# Patient Record
Sex: FEMALE | Race: WHITE | Employment: FULL TIME | URBAN - METROPOLITAN AREA
[De-identification: names, ages, dates, MRNs, and addresses within clinical notes are randomized per-mention and may not be internally consistent; named-entity substitution may affect disease eponyms.]

---

## 2017-11-06 PROBLEM — H11.442 CONJUNCTIVAL CYST OF LEFT EYE: Status: ACTIVE | Noted: 2017-11-06

## 2017-11-06 PROBLEM — H11.442 CONJUNCTIVAL CYST OF LEFT EYE: Status: RESOLVED | Noted: 2017-11-06 | Resolved: 2017-11-06

## 2018-03-23 LAB
CHLAMYDIA, EXTERNAL: NEGATIVE
HBSAG, EXTERNAL: NEGATIVE
HIV, EXTERNAL: NONREACTIVE
N. GONORRHEA, EXTERNAL: NEGATIVE
RPR, EXTERNAL: NORMAL
RUBELLA, EXTERNAL: NORMAL
TYPE, ABO & RH, EXTERNAL: NORMAL

## 2018-06-08 ENCOUNTER — HOSPITAL ENCOUNTER (EMERGENCY)
Age: 28
Discharge: HOME | End: 2018-06-08
Attending: EMERGENCY MEDICINE
Payer: COMMERCIAL

## 2018-06-08 VITALS
RESPIRATION RATE: 16 BRPM | SYSTOLIC BLOOD PRESSURE: 111 MMHG | HEART RATE: 105 BPM | DIASTOLIC BLOOD PRESSURE: 75 MMHG | OXYGEN SATURATION: 100 % | TEMPERATURE: 97 F

## 2018-06-08 DIAGNOSIS — O26.899 ABDOMINAL PAIN AFFECTING PREGNANCY: Primary | ICD-10-CM

## 2018-06-08 DIAGNOSIS — T75.4XXA ELECTROCUTION AND NONFATAL EFFECTS OF ELECTRIC CURRENT, INITIAL ENCOUNTER: ICD-10-CM

## 2018-06-08 DIAGNOSIS — R10.9 ABDOMINAL PAIN AFFECTING PREGNANCY: Primary | ICD-10-CM

## 2018-06-08 PROCEDURE — 99283 EMERGENCY DEPT VISIT LOW MDM: CPT

## 2018-06-08 NOTE — ED TRIAGE NOTES
\"I was plugging something into the wall yesterday and I got shocked. I feel fine. I'm about 19 weeks pregnant and my OB was concerned and said I should get checked out. I haven't been able to feel the baby move yet in my pregnancy before this so I didn't know if everything was ok. \"

## 2018-06-08 NOTE — ED PROVIDER NOTES
EMERGENCY DEPARTMENT HISTORY AND PHYSICAL EXAM    9:41 AM      Date: 2018  Patient Name: Fidelina Larios    History of Presenting Illness     Chief Complaint   Patient presents with    Electric Shock         History Provided By: Patient    Chief Complaint: abdominal cramping  Duration:  Hours  Timing:  Acute  Location: diffuse  Quality: Cramping  Severity: Mild  Modifying Factors: none  Associated Symptoms: denies any other associated signs or symptoms      Additional History (Context): Fidelina Larios is a 25 y.o. female with hx of ADD, , ~18.5 weeks pregnant who presents with c/o diffuse abdominal cramping since last night. Pt was plugging in a timer to an electrical outlet around 9pm when she was shocked by it. Notes she has had mild abdominal cramping since the incident. Notes she called her ob who instructed her to come to the ED for evaluation. Denies vaginal bleeding, chest pain, vomiting, diarrhea, dysuria, hematuria. PCP: Kennedy Corea MD    Current Outpatient Prescriptions   Medication Sig Dispense Refill    DEXTROAMPHETAMINE/AMPHETAMINE (ADDERALL PO) Take  by mouth.  norgestimate-ethinyl estradiol (ORTHO-CYCLEN, 28,) 0.25-35 mg-mcg tab Take 1 Tab by mouth daily. Past History     Past Medical History:  Past Medical History:   Diagnosis Date    ADD (attention deficit disorder)     Conjunctival cyst of left eye 2017    Nausea & vomiting        Past Surgical History:  Past Surgical History:   Procedure Laterality Date    HX WISDOM TEETH EXTRACTION         Family History:  History reviewed. No pertinent family history. Social History:  Social History   Substance Use Topics    Smoking status: Never Smoker    Smokeless tobacco: Never Used    Alcohol use 1.8 oz/week     1 Glasses of wine, 1 Cans of beer, 1 Shots of liquor per week       Allergies:  No Known Allergies      Review of Systems       Review of Systems   Constitutional: Negative for chills and fever. Respiratory: Negative for shortness of breath. Cardiovascular: Negative for chest pain. Gastrointestinal: Positive for abdominal pain. Negative for nausea and vomiting. Genitourinary: Negative for pelvic pain, vaginal bleeding, vaginal discharge and vaginal pain. Skin: Negative for rash. Neurological: Negative for weakness. All other systems reviewed and are negative. Physical Exam     Visit Vitals    /75 (BP 1 Location: Left arm, BP Patient Position: Sitting)    Pulse (!) 105    Temp 97 °F (36.1 °C)    Resp 16    LMP 10/26/2017    SpO2 100%         Physical Exam   Constitutional: She appears well-developed and well-nourished. No distress. HENT:   Head: Normocephalic and atraumatic. Neck: Normal range of motion. Neck supple. Cardiovascular: Normal rate, regular rhythm, normal heart sounds and intact distal pulses. Exam reveals no gallop and no friction rub. No murmur heard. Pulmonary/Chest: Effort normal and breath sounds normal. No respiratory distress. She has no wheezes. She has no rales. Abdominal: Soft. Bowel sounds are normal. She exhibits no distension and no mass. There is no tenderness. There is no rebound and no guarding. Gravid to umbilicus     Neurological: She is alert. Skin: Skin is warm. No rash noted. She is not diaphoretic. Nursing note and vitals reviewed. Diagnostic Study Results     Labs -  No results found for this or any previous visit (from the past 12 hour(s)). Radiologic Studies -   No orders to display         Medical Decision Making   I am the first provider for this patient. I reviewed the vital signs, available nursing notes, past medical history, past surgical history, family history and social history. Vital Signs-Reviewed the patient's vital signs.       Records Reviewed: Nursing Notes and Old Medical Records (Time of Review: 9:41 AM)    ED Course: Progress Notes, Reevaluation, and Consults:  10:10 AM Reviewed results with patient. Discussed need for close outpatient follow-up. Discussed strict return precautions, including vaginal bleeding, increased abdominal pain, or any other medical concerns. Provider Notes (Medical Decision Making): 24 yo F , ~18.5 weeks pregnant who presents due to abdominal pain after electric shock. No vaginal bleeding or rush of fluids. Bedside US performed by Dr. Terry Calderon demonstrates fetal heart beat 160 and good fetal movements. Pt has ob for follow-up. Stable for d/c with outpatient follow-up. Procedures: Bedside US  Date/Time: 2018 10:09 AM  Performed by: Antony Henderson  Authorized by: Antony Henderson     Written consent obtained: Yes    Given by:  Patient  Performed by: Attending  Type of Procedure: abdominal examination, obstetrical; fetal heart beat 160, good movement of fetus           Diagnosis     Clinical Impression:   1. Abdominal pain affecting pregnancy    2. Electrocution and nonfatal effects of electric current, initial encounter        Disposition: home     Follow-up Information     Follow up With Details Comments Contact [facility] EMERGENCY DEPT  If symptoms worsen [address]    Ashlyn Raymond MD In 2 days  Jeffery Doe [phone]  [phone]             Patient's Medications   Start Taking    No medications on file   Continue Taking    DEXTROAMPHETAMINE/AMPHETAMINE (ADDERALL PO)    Take  by mouth. NORGESTIMATE-ETHINYL ESTRADIOL (ORTHO-CYCLEN, 28,) 0.25-35 MG-MCG TAB    Take 1 Tab by mouth daily.    These Medications have changed    No medications on file   Stop Taking    No medications on file

## 2018-06-08 NOTE — LETTER
[facility] EMERGENCY DEPT 
[address] [phone] 
[phone] Work/School Note Date: 6/8/2018 To Whom It May concern: 
 
Merlene Mcclain was seen and treated today in the emergency room by the following provider(s): 
Attending Provider: Justin Snider DO Physician Assistant: Neena Duran. Merlene Mcclain may return to work on 6/9/18. Sincerely, 
 
 
 
 
Neena Duran

## 2018-06-08 NOTE — DISCHARGE INSTRUCTIONS
Electrical Shock: Care Instructions  Your Care Instructions  You have had an electrical shock. This may have happened when you used an electrical appliance or power cord. Lightning and stun guns also can cause electrical shocks. The shock can cause a burn where the current enters and leaves your body. The electricity may have injured blood vessels, nerves, and muscles. The electricity also could have affected your heart and lungs. You might not see all the damage the shock caused for up to 10 days after the shock. Your doctor will tell you what to look for depending on the type of shock you got. Follow-up care is a key part of your treatment and safety. Be sure to make and go to all appointments, and call your doctor if you are having problems. It's also a good idea to know your test results and keep a list of the medicines you take. How can you care for yourself at home? If you have a mild burn:  · Clean the area each day with mild soap and water. · Bandage the wound. ¨ If the doctor told you to use an ointment under the bandage, use it exactly as directed. ¨ Cover the burn with a nonstick gauze pad. ¨ Tape the pad to your skin, well away from the burn, or follow other dressing instructions, as your doctor advises. ¨ Do not wrap tape all the way around a hand, arm, or leg. This can cause swelling. ¨ Keep the bandage clean and dry. Change the bandage 2 times a day and anytime it gets wet. · Do not break blisters open. This increases the chance of infection. If a blister breaks open by itself, blot up the liquid, and leave the skin that covered the blister. This helps protect the new skin. For pain and itching:  · Take an over-the-counter pain medicine, such as acetaminophen (Tylenol), ibuprofen (Advil, Motrin), or naproxen (Aleve). Read and follow all instructions on the label. Do not use aspirin, because it can make bleeding in the burned area worse.   · Do not take two or more pain medicines at the same time unless the doctor told you to. Many pain medicines have acetaminophen, which is Tylenol. Too much acetaminophen (Tylenol) can be harmful. · If the burn itches, do not scratch it. Try an over-the-counter antihistamine, such as diphenhydramine (Benadryl) or loratadine (Claritin). Read and follow all instructions on the label. Preventing electrical shocks at home  · Place plug covers on all outlets. · Use extra caution when using electrical items in areas where water sources are nearby, such as using a hair dryer in the bathroom. · Do not overload electrical outlets by using too many extension cords or electrical receptacle multipliers. · Replace electrical equipment and appliances that show signs of wear, such as having frayed or loose wires. When should you call for help? Call 911 anytime you think you may need emergency care. For example, call if:  ? · You passed out (lost consciousness). ?Call your doctor now or seek immediate medical care if:  ? · You have symptoms of infection, such as:  ¨ Increased pain, swelling, warmth, or redness. ¨ Red streaks leading from the area. ¨ Pus draining from the area. ¨ A fever. ? · You have a hard time breathing. ? · You have new or worse pain in the burn area. ? · Your urine looks pink or brown. ? · Your muscles ache or feel weak. ? Watch closely for changes in your health, and be sure to contact your doctor if you have any problems. Where can you learn more? Go to http://kraig-dena.info/. Enter 33 17 13 in the search box to learn more about \"Electrical Shock: Care Instructions. \"  Current as of: March 20, 2017  Content Version: 11.4  © 5078-4309 [employer]. Care instructions adapted under license by [employer] (which disclaims liability or warranty for this information).  If you have questions about a medical condition or this instruction, always ask your healthcare professional. [employer] disclaims any warranty or liability for your use of this information. Belly Pain in Pregnancy: Care Instructions  Your Care Instructions    When you're pregnant, any belly pain can be a worry. You may not want to call your doctor about every pain you have. But you don't want to miss something that is dangerous for you or your baby. Even if it feels familiar, belly pain can mean something new when you're pregnant. It's important to know when to call your doctor. It will also help to know how to care for yourself at home when your pain is not caused by anything harmful. · When belly pain is more severe or constant, see a doctor right away. · If you're sure your belly pain is a sign of labor, call your doctor. · When belly pain is brief, it's usually a normal part of pregnancy. It might be related to changes in the growing uterus. Or it could be the stretching of ligaments called round ligaments. These ligaments help support the uterus. Round ligament pain can be on either side of your belly. It can also be felt in your hips or groin. Follow-up care is a key part of your treatment and safety. Be sure to make and go to all appointments, and call your doctor if you are having problems. It's also a good idea to know your test results and keep a list of the medicines you take. How can you tell if belly pain is a sign of labor? When belly pain is caused by labor, it can feel like mild or menstrual-like cramps in your lower belly. These cramps are probably contractions. They can happen in your second or third trimester. You may also have:  · A steady, dull ache in your lower back, pelvis, or thighs. · A feeling of pressure in your pelvis or lower belly. · Changes in your vaginal discharge or a sudden release of fluid from the vagina. If you think you are in labor, call your doctor. How can you care for yourself at home?   When belly pain is mild and is not a symptom of labor:  · Rest until you feel better. · Take a warm bath. · Think about what you drink and eat:  ¨ Drink plenty of fluids. Choose water and other caffeine-free clear liquids until you feel better. ¨ Try eating small, frequent meals. If your stomach is upset, try bland, low-fat foods like plain rice, broiled chicken, toast, and yogurt. · Think about how you move if you are having brief pains from stretching of the round ligaments. ¨ Try gentle stretching. ¨ Move a little more slowly when turning in bed or getting up from a chair, so those ligaments don't stretch quickly. ¨ Lean forward a bit if you think you are going to cough or sneeze. When should you call for help? Call 911 anytime you think you may need emergency care. For example, call if:  ? · You have sudden, severe pain in your belly. ? · You have severe vaginal bleeding. ?Call your doctor now or seek immediate medical care if:  ? · You have new or worse belly pain or cramping. ? · You have any vaginal bleeding. ? · You have a fever. ? · You have symptoms of preeclampsia, such as:  ¨ Sudden swelling of your face, hands, or feet. ¨ New vision problems (such as dimness or blurring). ¨ A severe headache. ? · You think that you may be in labor. This means that you've had at least 8 contractions within 1 hour or at least 4 contractions within 20 minutes, even after you change your position and drink fluids. ? · You have symptoms of a urinary tract infection. These may include:  ¨ Pain or burning when you urinate. ¨ A frequent need to urinate without being able to pass much urine. ¨ Pain in the flank, which is just below the rib cage and above the waist on either side of the back. ¨ Blood in your urine. ? Watch closely for changes in your health, and be sure to contact your doctor if you are worried about your or your baby's health. Where can you learn more? Go to http://kraig-dena.info/.   Enter 280 [id number] 005 in the search box to learn more about \"Belly Pain in Pregnancy: Care Instructions. \"  Current as of: March 16, 2017  Content Version: 11.4  © 4376-9686 [employer]. Care instructions adapted under license by "[employer]" (which disclaims liability or warranty for this information). If you have questions about a medical condition or this instruction, always ask your healthcare professional. [address] any warranty or liability for your use of this information.

## 2018-08-01 ENCOUNTER — HOSPITAL ENCOUNTER (EMERGENCY)
Age: 28
Discharge: HOME | End: 2018-08-01
Attending: OBSTETRICS & GYNECOLOGY | Admitting: OBSTETRICS & GYNECOLOGY
Payer: COMMERCIAL

## 2018-08-01 VITALS
HEART RATE: 99 BPM | WEIGHT: 163 LBS | DIASTOLIC BLOOD PRESSURE: 55 MMHG | BODY MASS INDEX: 27.83 KG/M2 | RESPIRATION RATE: 18 BRPM | SYSTOLIC BLOOD PRESSURE: 113 MMHG | TEMPERATURE: 97.9 F | HEIGHT: 64 IN

## 2018-08-01 LAB
ALBUMIN SERPL-MCNC: 2.5 G/DL (ref 3.4–5)
ALBUMIN/GLOB SERPL: 0.7 {RATIO} (ref 0.8–1.7)
ALP SERPL-CCNC: 110 U/L (ref 45–117)
ALT SERPL-CCNC: 16 U/L (ref 13–56)
ANION GAP SERPL CALC-SCNC: 7 MMOL/L (ref 3–18)
APPEARANCE UR: CLEAR
AST SERPL-CCNC: 15 U/L (ref 15–37)
BASOPHILS # BLD: 0 K/UL (ref 0–0.1)
BASOPHILS NFR BLD: 0 % (ref 0–2)
BILIRUB SERPL-MCNC: 0.3 MG/DL (ref 0.2–1)
BILIRUB UR QL: NEGATIVE
BUN SERPL-MCNC: 7 MG/DL (ref 7–18)
BUN/CREAT SERPL: 15 (ref 12–20)
CALCIUM SERPL-MCNC: 8.2 MG/DL (ref 8.5–10.1)
CHLORIDE SERPL-SCNC: 107 MMOL/L (ref 100–108)
CO2 SERPL-SCNC: 26 MMOL/L (ref 21–32)
COLOR UR: YELLOW
CREAT SERPL-MCNC: 0.47 MG/DL (ref 0.6–1.3)
DIFFERENTIAL METHOD BLD: ABNORMAL
EOSINOPHIL # BLD: 0 K/UL (ref 0–0.4)
EOSINOPHIL NFR BLD: 1 % (ref 0–5)
ERYTHROCYTE [DISTWIDTH] IN BLOOD BY AUTOMATED COUNT: 14.3 % (ref 11.6–14.5)
GLOBULIN SER CALC-MCNC: 3.6 G/DL (ref 2–4)
GLUCOSE SERPL-MCNC: 108 MG/DL (ref 74–99)
GLUCOSE UR QL STRIP.AUTO: NEGATIVE MG/DL
HCT VFR BLD AUTO: 31.5 % (ref 35–45)
HGB BLD-MCNC: 10.5 G/DL (ref 12–16)
KETONES UR-MCNC: NEGATIVE MG/DL
LEUKOCYTE ESTERASE UR QL STRIP: ABNORMAL
LYMPHOCYTES # BLD: 2.1 K/UL (ref 0.9–3.6)
LYMPHOCYTES NFR BLD: 24 % (ref 21–52)
MCH RBC QN AUTO: 31.1 PG (ref 24–34)
MCHC RBC AUTO-ENTMCNC: 33.3 G/DL (ref 31–37)
MCV RBC AUTO: 93.2 FL (ref 74–97)
MONOCYTES # BLD: 0.6 K/UL (ref 0.05–1.2)
MONOCYTES NFR BLD: 7 % (ref 3–10)
NEUTS SEG # BLD: 5.9 K/UL (ref 1.8–8)
NEUTS SEG NFR BLD: 68 % (ref 40–73)
NITRITE UR QL: NEGATIVE
PH UR: 7 [PH] (ref 5–9)
PLATELET # BLD AUTO: 200 K/UL (ref 135–420)
PMV BLD AUTO: 10.4 FL (ref 9.2–11.8)
POTASSIUM SERPL-SCNC: 3.9 MMOL/L (ref 3.5–5.5)
PROT SERPL-MCNC: 6.1 G/DL (ref 6.4–8.2)
PROT UR QL: NEGATIVE MG/DL
RBC # BLD AUTO: 3.38 M/UL (ref 4.2–5.3)
RBC # UR STRIP: NEGATIVE /UL
SERVICE CMNT-IMP: ABNORMAL
SODIUM SERPL-SCNC: 140 MMOL/L (ref 136–145)
SP GR UR: 1.01 (ref 1–1.02)
UROBILINOGEN UR QL: 0.2 EU/DL (ref 0.2–1)
WBC # BLD AUTO: 8.6 K/UL (ref 4.6–13.2)

## 2018-08-01 PROCEDURE — 80053 COMPREHEN METABOLIC PANEL: CPT | Performed by: OBSTETRICS & GYNECOLOGY

## 2018-08-01 PROCEDURE — 81003 URINALYSIS AUTO W/O SCOPE: CPT

## 2018-08-01 PROCEDURE — 85025 COMPLETE CBC W/AUTO DIFF WBC: CPT | Performed by: OBSTETRICS & GYNECOLOGY

## 2018-08-01 PROCEDURE — 99282 EMERGENCY DEPT VISIT SF MDM: CPT

## 2018-08-01 NOTE — DISCHARGE INSTRUCTIONS
Anemia: Care Instructions  Your Care Instructions    Anemia is a low level of red blood cells, which carry oxygen throughout your body. Many things can cause anemia. Lack of iron is one of the most common causes. Your body needs iron to make hemoglobin, a substance in red blood cells that carries oxygen from the lungs to your body's cells. Without enough iron, the body produces fewer and smaller red blood cells. As a result, your body's cells do not get enough oxygen, and you feel tired and weak. And you may have trouble concentrating. Bleeding is the most common cause of a lack of iron. You may have heavy menstrual bleeding or bleeding caused by conditions such as ulcers, hemorrhoids, or cancer. Regular use of aspirin or other anti-inflammatory medicines (such as ibuprofen) also can cause bleeding in some people. A lack of iron in your diet also can cause anemia, especially at times when the body needs more iron, such as during pregnancy, infancy, and the teen years. Your doctor may have prescribed iron pills. It may take several months of treatment for your iron levels to return to normal. Your doctor also may suggest that you eat foods that are rich in iron, such as meat and beans. There are many other causes of anemia. It is not always due to a lack of iron. Finding the specific cause of your anemia will help your doctor find the right treatment for you. Follow-up care is a key part of your treatment and safety. Be sure to make and go to all appointments, and call your doctor if you are having problems. It's also a good idea to know your test results and keep a list of the medicines you take. How can you care for yourself at home? · Take your medicines exactly as prescribed. Call your doctor if you think you are having a problem with your medicine. · If your doctor recommends iron pills, take them as directed:  ¨ Try to take the pills on an empty stomach about 1 hour before or 2 hours after meals. But you may need to take iron with food to avoid an upset stomach. ¨ Do not take antacids or drink milk or caffeine drinks (such as coffee, tea, or cola) at the same time or within 2 hours of the time that you take your iron. They can make it hard for your body to absorb the iron. ¨ Vitamin C (from food or supplements) helps your body absorb iron. Try taking iron pills with a glass of orange juice or some other food that is high in vitamin C, such as citrus fruits. ¨ Iron pills may cause stomach problems, such as heartburn, nausea, diarrhea, constipation, and cramps. Be sure to drink plenty of fluids, and include fruits, vegetables, and fiber in your diet each day. Iron pills often make your bowel movements dark or green. ¨ If you forget to take an iron pill, do not take a double dose of iron the next time you take a pill. ¨ Keep iron pills out of the reach of small children. An overdose of iron can be very dangerous. · Follow your doctor's advice about eating iron-rich foods. These include red meat, shellfish, poultry, eggs, beans, raisins, whole-grain bread, and leafy green vegetables. · Steam vegetables to help them keep their iron content. When should you call for help? Call 911 anytime you think you may need emergency care. For example, call if:    · You have symptoms of a heart attack. These may include:  ¨ Chest pain or pressure, or a strange feeling in the chest.  ¨ Sweating. ¨ Shortness of breath. ¨ Nausea or vomiting. ¨ Pain, pressure, or a strange feeling in the back, neck, jaw, or upper belly or in one or both shoulders or arms. ¨ Lightheadedness or sudden weakness. ¨ A fast or irregular heartbeat. After you call 911, the  may tell you to chew 1 adult-strength or 2 to 4 low-dose aspirin. Wait for an ambulance.  Do not try to drive yourself.     · You passed out (lost consciousness).    Call your doctor now or seek immediate medical care if:    · You have new or increased shortness of breath.     · You are dizzy or lightheaded, or you feel like you may faint.     · Your fatigue and weakness continue or get worse.     · You have any abnormal bleeding, such as:  ¨ Nosebleeds. ¨ Vaginal bleeding that is different (heavier, more frequent, at a different time of the month) than what you are used to. ¨ Bloody or black stools, or rectal bleeding. ¨ Bloody or pink urine.    Watch closely for changes in your health, and be sure to contact your doctor if:    · You do not get better as expected. Where can you learn more? Go to http://kraig-dena.info/. Enter R301 in the search box to learn more about \"Anemia: Care Instructions. \"  Current as of: 2017  Content Version: 11.7  © 7996-1914 [employer]. Care instructions adapted under license by [employer] (which disclaims liability or warranty for this information). If you have questions about a medical condition or this instruction, always ask your healthcare professional. [place] [address] any warranty or liability for your use of this information. Weeks 26 to 30 of Your Pregnancy: Care Instructions  Your Care Instructions    You are now in your last trimester of pregnancy. Your baby is growing rapidly. And you'll probably feel your baby moving around more often. Your doctor may ask you to count your baby's kicks. Your back may ache as your body gets used to your baby's size and length. If you haven't already had the Tdap shot during this pregnancy, talk to your doctor about getting it. It will help protect your  against pertussis infection. During this time, it's important to take care of yourself and pay attention to what your body needs. If you feel sexual, explore ways to be close with your partner that match your comfort and desire. Use the tips provided in this care sheet to find ways to be sexual in your own way.   Follow-up care is a key part of your treatment and safety. Be sure to make and go to all appointments, and call your doctor if you are having problems. It's also a good idea to know your test results and keep a list of the medicines you take. How can you care for yourself at home? Take it easy at work  · Take frequent breaks. If possible, stop working when you are tired, and rest during your lunch hour. · Take bathroom breaks every 2 hours. · Change positions often. If you sit for long periods, stand up and walk around. · When you stand for a long time, keep one foot on a low stool with your knee bent. After standing a lot, sit with your feet up. · Avoid fumes, chemicals, and tobacco smoke. Be sexual in your own way  · Having sex during pregnancy is okay, unless your doctor tells you not to. · You may be very interested in sex, or you may have no interest at all. · Your growing belly can make it hard to find a good position during intercourse. [place] and explore. · You may get cramps in your uterus when your partner touches your breasts. · A back rub may relieve the backache or cramps that sometimes follow orgasm. Learn about  labor  · Watch for signs of  labor. You may be going into labor if:  ¨ You have menstrual-like cramps, with or without nausea. ¨ You have about 6 or more contractions in 1 hour, even after you have had a glass of water and are resting. ¨ You have a low, dull backache that does not go away when you change your position. ¨ You have pain or pressure in your pelvis that comes and goes in a pattern. ¨ You have intestinal cramping or flu-like symptoms, with or without diarrhea. ¨ You notice an increase or change in your vaginal discharge. Discharge may be heavy, mucus-like, watery, or streaked with blood. ¨ Your water breaks. · If you think you have  labor:  ¨ Drink 2 or 3 glasses of water or juice. Not drinking enough fluids can cause contractions.   ¨ Stop what you are doing, and empty your bladder. Then lie down on your left side for at least 1 hour. ¨ While lying on your side, find your breast bone. Put your fingers in the soft spot just below it. Move your fingers down toward your belly button to find the top of your uterus. Check to see if it is tight. ¨ Contractions can be weak or strong. Record your contractions for an hour. Time a contraction from the start of one contraction to the start of the next one. ¨ Single or several strong contractions without a pattern are called Omero-Perez contractions. They are practice contractions but not the start of labor. They often stop if you change what you are doing. ¨ Call your doctor if you have regular contractions. Where can you learn more? Go to http://kraig-dena.info/. Enter O219 in the search box to learn more about \"Weeks 26 to 30 of Your Pregnancy: Care Instructions. \"  Current as of: November 21, 2017  Content Version: 11.7  © 8166-7358 [employer]. Care instructions adapted under license by [employer] (which disclaims liability or warranty for this information). If you have questions about a medical condition or this instruction, always ask your healthcare professional. [address] any warranty or liability for your use of this information.

## 2018-08-01 NOTE — IP AVS SNAPSHOT
[address] Patient: Ardyth Bernheim MRN: [id number] FHU:[id number] A check bk indicates which time of day the medication should be taken. My Medications ASK your doctor about these medications Instructions Each Dose to Equal  
 Morning Noon Evening Bedtime ADDERALL PO Your last dose was: Your next dose is: Take  by mouth. ORTHO-CYCLEN (28) 0.25-35 mg-mcg Tab Generic drug:  norgestimate-ethinyl estradiol Your last dose was: Your next dose is: Take 1 Tab by mouth daily. 1 Tab

## 2018-08-01 NOTE — H&P
Obstetrical Problem History & Physical  
 
Name: Amena Dickerson MRN: [id number]  SSN: [id number] YOB: 1990  Age: 25 y.o. Sex: female Subjective: Chief complaint:   
Chief Complaint Patient presents with  Loss of Consciousness  Dizziness 28yo  at 26.1wks with CRYSTAL 18 by LMP c/w 8.0wk US. She follows with Dr. Ade Galvan for prenatal care. Pt reports felt dizzy then \"passed out\" and hit top of abdomen on a desk. She reports ate a donut and had apple juice 1hr prior. No other food. She denies any complaints at this time. She denies ctxs, VB, LOF, HA, visual changes, abd pain, CP, SOB, dizziness, lightheadedness, palpitations. She reports intermittent palpitations and dizziness since 20wks. She already has an appt with cardiology 18 at 0930. +FM. Patient reports has been moving into a new house recently and has been very active. OB HISTORY 
OB History  Para Term  AB Living 2 1 1   1 SAB TAB Ectopic Molar Multiple Live Births 0 1 PAST MEDICAL HISTORY 
GERD 
HSV 
ADHD - weaning off Adderall PAST SURGICAL HISTORY Past Surgical History:  
Procedure Laterality Date  HX WISDOM TEETH EXTRACTION    
 
 
SOCIAL HISTORY Social History Social History  Marital status:  Spouse name: Jose Zimmerman  Number of children: N/A  
 Years of education: N/A Occupational History  Not on file. Social History Main Topics  Smoking status: Never Smoker  Smokeless tobacco: Never Used  Alcohol use No  
 Drug use: No  
 Sexual activity: Yes  
  Partners: Male Other Topics Concern  Not on file Social History Narrative FAMILY HISTORY 
HTN - mother, MGM Lung cancer - MGF Diabetes - PGF Lymphoma - mother Pharyngeal cancer - PGF ALLERGY: 
No Known Allergies HOME MEDICATIONS: 
Prenatal vitamin daily Adderall 20mg daily Review of Systems: A comprehensive review of systems was negative other than stated in HPI. Objective: VITAL SIGNS: 
Visit Vitals  /55  Pulse 99  Temp 97.9 °F (36.6 °C)  Resp 18  Ht 5' 4\" (1.626 m)  Wt 73.9 kg (163 lb)  BMI 27.98 kg/m2 Physical Exam: 
Gen - A&D, NAD, pt walking in high heels without difficutly CV - RRR Pulm - CTAB Abd - soft, nttp, gravid Ext - nttp, no clubbing/cyanosis/edema, 2+ pedal pulses TOCO - no ctxs FHTs - 140s, mod variability, +accels, no decels BSUS - cephalic, posterior placenta, SDP 5.2, +FM, placenta intact without evidence of trauma/abruption/abnormalities Recent Results (from the past 12 hour(s)) POC URINE MACROSCOPIC Collection Time: 08/01/18  2:12 PM  
Result Value Ref Range Color YELLOW Appearance CLEAR Spec. gravity (POC) 1.015 1.001 - 1.023    
 pH, urine  (POC) 7.0 5.0 - 9.0 Protein (POC) NEGATIVE  NEG mg/dL Glucose, urine (POC) NEGATIVE  NEG mg/dL Ketones (POC) NEGATIVE  NEG mg/dL Bilirubin (POC) NEGATIVE  NEG Blood (POC) NEGATIVE  NEG Urobilinogen (POC) 0.2 0.2 - 1.0 EU/dL Nitrite (POC) NEGATIVE  NEG Leukocyte esterase (POC) SMALL (A) NEG Performed by Jvoani Alejo CBC WITH AUTOMATED DIFF Collection Time: 08/01/18  2:18 PM  
Result Value Ref Range WBC 8.6 4.6 - 13.2 K/uL  
 RBC 3.38 (L) 4.20 - 5.30 M/uL  
 HGB 10.5 (L) 12.0 - 16.0 g/dL HCT 31.5 (L) 35.0 - 45.0 % MCV 93.2 74.0 - 97.0 FL  
 MCH 31.1 24.0 - 34.0 PG  
 MCHC 33.3 31.0 - 37.0 g/dL  
 RDW 14.3 11.6 - 14.5 % PLATELET 945 311 - 488 K/uL MPV 10.4 9.2 - 11.8 FL  
 NEUTROPHILS 68 40 - 73 % LYMPHOCYTES 24 21 - 52 % MONOCYTES 7 3 - 10 % EOSINOPHILS 1 0 - 5 % BASOPHILS 0 0 - 2 %  
 ABS. NEUTROPHILS 5.9 1.8 - 8.0 K/UL  
 ABS. LYMPHOCYTES 2.1 0.9 - 3.6 K/UL  
 ABS. MONOCYTES 0.6 0.05 - 1.2 K/UL  
 ABS. EOSINOPHILS 0.0 0.0 - 0.4 K/UL  
 ABS. BASOPHILS 0.0 0.0 - 0.1 K/UL  
 DF AUTOMATED METABOLIC PANEL, COMPREHENSIVE  
 Collection Time: 18  2:18 PM  
Result Value Ref Range Sodium 140 136 - 145 mmol/L Potassium 3.9 3.5 - 5.5 mmol/L Chloride 107 100 - 108 mmol/L  
 CO2 26 21 - 32 mmol/L Anion gap 7 3.0 - 18 mmol/L Glucose 108 (H) 74 - 99 mg/dL BUN 7 7.0 - 18 MG/DL Creatinine 0.47 (L) 0.6 - 1.3 MG/DL  
 BUN/Creatinine ratio 15 12 - 20 GFR est AA >60 >60 ml/min/1.73m2 GFR est non-AA >60 >60 ml/min/1.73m2 Calcium 8.2 (L) 8.5 - 10.1 MG/DL Bilirubin, total 0.3 0.2 - 1.0 MG/DL  
 ALT (SGPT) 16 13 - 56 U/L  
 AST (SGOT) 15 15 - 37 U/L Alk. phosphatase 110 45 - 117 U/L Protein, total 6.1 (L) 6.4 - 8.2 g/dL Albumin 2.5 (L) 3.4 - 5.0 g/dL Globulin 3.6 2.0 - 4.0 g/dL A-G Ratio 0.7 (L) 0.8 - 1.7 AssessmentPlan:  
 
29yo  at 26.1wks - Pt asymptomatic with VSS and wnl since presentation. Monitored for several hours and patient remains asymptomatic  Labs wnl.  
- Hgb 10.5 - advised iron with Vit C. Pt reports has taken [place] before and desires to take this - Advised patient to stay well hydrated and have small frequent meals throughout the day. - Discussed importance of cardiology appt - Lifting precautions reviewed with patient's move this week - F/u with next scheduled appt with Dr. Beatrice Kate next week.   
 
 
Signed By:   
Rosita Vital MD 
2018 4:22 PM

## 2018-08-01 NOTE — PROGRESS NOTES
1505-Informed Dr Gretel Mccabe nurse of need to review labs/strip 1525-Dr Rudd's nurse Erna Biggs informed that lab results are back. Will review when available.

## 2018-08-01 NOTE — PROGRESS NOTES
Dr Marhta Higgins notified of patient arrival and complaints. Orders received for CBC/CMP, pt able to drink and eat at this time.

## 2018-08-01 NOTE — PROGRESS NOTES
Pt of Dr Glover Innocent ambulatory to unit for complaint of dizziness which resulted in syncope at her workplace,approximately 1030am today. Pt reports hitting her upper abdomen on her desk, just below sternum. Evidence of bruising noted. Pt states felt dizziness X2 over the weekend (once Saturday and once ). Pt is a  at 26.1 weeks gestation. Pt states she has a referral to cardiology since last week due to recurring heart pounding/racing and intermittent chest discomfort. Pt reports \"just a little bit\" of chest pain and SOB today when sitting in the car driving. EFM and TOCO applied, pt denies all symptoms at this time, states Dr Christopher Edwards office instructed her to come to LD for evaluation.

## 2018-08-01 NOTE — PROGRESS NOTES
1615- Discharge instructions including begin PO iron and increase fluids discussed by Dr Venkatesh Cesar and RN.  
9994- In room to sign D/C instructions, pt has already left unit.

## 2018-08-01 NOTE — IP AVS SNAPSHOT
[address] Patient: Ardyth Bernheim MRN: [id number] IJR:[id number] About your hospitalization You were admitted on:  N/A You last received care in the:  [address] [place] [address] You were discharged on:  August 1, 2018 Why you were hospitalized Your primary diagnosis was:  Not on File Follow-up Information None Discharge Orders None A check bk indicates which time of day the medication should be taken. My Medications ASK your doctor about these medications Instructions Each Dose to Equal  
 Morning Noon Evening Bedtime ADDERALL PO Your last dose was: Your next dose is: Take  by mouth. ORTHO-CYCLEN (28) 0.25-35 mg-mcg Tab Generic drug:  norgestimate-ethinyl estradiol Your last dose was: Your next dose is: Take 1 Tab by mouth daily. 1 Tab Discharge Instructions Anemia: Care Instructions Your Care Instructions Anemia is a low level of red blood cells, which carry oxygen throughout your body. Many things can cause anemia. Lack of iron is one of the most common causes. Your body needs iron to make hemoglobin, a substance in red blood cells that carries oxygen from the lungs to your body's cells. Without enough iron, the body produces fewer and smaller red blood cells. As a result, your body's cells do not get enough oxygen, and you feel tired and weak. And you may have trouble concentrating. Bleeding is the most common cause of a lack of iron. You may have heavy menstrual bleeding or bleeding caused by conditions such as ulcers, hemorrhoids, or cancer. Regular use of aspirin or other anti-inflammatory medicines (such as ibuprofen) also can cause bleeding in some people.  A lack of iron in your diet also can cause anemia, especially at times when the body needs more iron, such as during pregnancy, infancy, and the teen years. Your doctor may have prescribed iron pills. It may take several months of treatment for your iron levels to return to normal. Your doctor also may suggest that you eat foods that are rich in iron, such as meat and beans. There are many other causes of anemia. It is not always due to a lack of iron. Finding the specific cause of your anemia will help your doctor find the right treatment for you. Follow-up care is a key part of your treatment and safety. Be sure to make and go to all appointments, and call your doctor if you are having problems. It's also a good idea to know your test results and keep a list of the medicines you take. How can you care for yourself at home? · Take your medicines exactly as prescribed. Call your doctor if you think you are having a problem with your medicine. · If your doctor recommends iron pills, take them as directed: ¨ Try to take the pills on an empty stomach about 1 hour before or 2 hours after meals. But you may need to take iron with food to avoid an upset stomach. ¨ Do not take antacids or drink milk or caffeine drinks (such as coffee, tea, or cola) at the same time or within 2 hours of the time that you take your iron. They can make it hard for your body to absorb the iron. ¨ Vitamin C (from food or supplements) helps your body absorb iron. Try taking iron pills with a glass of orange juice or some other food that is high in vitamin C, such as citrus fruits. ¨ Iron pills may cause stomach problems, such as heartburn, nausea, diarrhea, constipation, and cramps. Be sure to drink plenty of fluids, and include fruits, vegetables, and fiber in your diet each day. Iron pills often make your bowel movements dark or green.  
¨ If you forget to take an iron pill, do not take a double dose of iron the next time you take a pill. ¨ Keep iron pills out of the reach of small children. An overdose of iron can be very dangerous. · Follow your doctor's advice about eating iron-rich foods. These include red meat, shellfish, poultry, eggs, beans, raisins, whole-grain bread, and leafy green vegetables. · Steam vegetables to help them keep their iron content. When should you call for help? Call 911 anytime you think you may need emergency care. For example, call if: 
  · You have symptoms of a heart attack. These may include: ¨ Chest pain or pressure, or a strange feeling in the chest. 
¨ Sweating. ¨ Shortness of breath. ¨ Nausea or vomiting. ¨ Pain, pressure, or a strange feeling in the back, neck, jaw, or upper belly or in one or both shoulders or arms. ¨ Lightheadedness or sudden weakness. ¨ A fast or irregular heartbeat. After you call 911, the  may tell you to chew 1 adult-strength or 2 to 4 low-dose aspirin. Wait for an ambulance. Do not try to drive yourself.  
  · You passed out (lost consciousness).  
 Call your doctor now or seek immediate medical care if: 
  · You have new or increased shortness of breath.  
  · You are dizzy or lightheaded, or you feel like you may faint.  
  · Your fatigue and weakness continue or get worse.  
  · You have any abnormal bleeding, such as: 
¨ Nosebleeds. ¨ Vaginal bleeding that is different (heavier, more frequent, at a different time of the month) than what you are used to. ¨ Bloody or black stools, or rectal bleeding. ¨ Bloody or pink urine.  
 Watch closely for changes in your health, and be sure to contact your doctor if: 
  · You do not get better as expected. Where can you learn more? Go to http://kraig-dena.info/. Enter R301 in the search box to learn more about \"Anemia: Care Instructions. \" Current as of: October 9, 2017 Content Version: 11.7 © 8352-6955 [employer], [employer].  Care instructions adapted under license by [address] (which disclaims liability or warranty for this information). If you have questions about a medical condition or this instruction, always ask your healthcare professional. [address] any warranty or liability for your use of this information. Weeks 26 to 30 of Your Pregnancy: Care Instructions Your Care Instructions You are now in your last trimester of pregnancy. Your baby is growing rapidly. And you'll probably feel your baby moving around more often. Your doctor may ask you to count your baby's kicks. Your back may ache as your body gets used to your baby's size and length. If you haven't already had the Tdap shot during this pregnancy, talk to your doctor about getting it. It will help protect your  against pertussis infection. During this time, it's important to take care of yourself and pay attention to what your body needs. If you feel sexual, explore ways to be close with your partner that match your comfort and desire. Use the tips provided in this care sheet to find ways to be sexual in your own way. Follow-up care is a key part of your treatment and safety. Be sure to make and go to all appointments, and call your doctor if you are having problems. It's also a good idea to know your test results and keep a list of the medicines you take. How can you care for yourself at home? Take it easy at work · Take frequent breaks. If possible, stop working when you are tired, and rest during your lunch hour. · Take bathroom breaks every 2 hours. · Change positions often. If you sit for long periods, stand up and walk around. · When you stand for a long time, keep one foot on a low stool with your knee bent. After standing a lot, sit with your feet up. · Avoid fumes, chemicals, and tobacco smoke. Be sexual in your own way · Having sex during pregnancy is okay, unless your doctor tells you not to. · You may be very interested in sex, or you may have no interest at all. · Your growing belly can make it hard to find a good position during intercourse. [place] and explore. · You may get cramps in your uterus when your partner touches your breasts. · A back rub may relieve the backache or cramps that sometimes follow orgasm. Learn about  labor · Watch for signs of  labor. You may be going into labor if: 
¨ You have menstrual-like cramps, with or without nausea. ¨ You have about 6 or more contractions in 1 hour, even after you have had a glass of water and are resting. ¨ You have a low, dull backache that does not go away when you change your position. ¨ You have pain or pressure in your pelvis that comes and goes in a pattern. ¨ You have intestinal cramping or flu-like symptoms, with or without diarrhea. ¨ You notice an increase or change in your vaginal discharge. Discharge may be heavy, mucus-like, watery, or streaked with blood. ¨ Your water breaks. · If you think you have  labor: ¨ Drink 2 or 3 glasses of water or juice. Not drinking enough fluids can cause contractions. ¨ Stop what you are doing, and empty your bladder. Then lie down on your left side for at least 1 hour. ¨ While lying on your side, find your breast bone. Put your fingers in the soft spot just below it. Move your fingers down toward your belly button to find the top of your uterus. Check to see if it is tight. ¨ Contractions can be weak or strong. Record your contractions for an hour. Time a contraction from the start of one contraction to the start of the next one. ¨ Single or several strong contractions without a pattern are called Brooks-Perez contractions. They are practice contractions but not the start of labor. They often stop if you change what you are doing. ¨ Call your doctor if you have regular contractions. Where can you learn more? Go to http://kraig-dena.info/. Enter P495 in the search box to learn more about \"Weeks 26 to 30 of Your Pregnancy: Care Instructions. \" Current as of: November 21, 2017 Content Version: 11.7 © 2586-3406 [employer]. Care instructions adapted under license by [employer] (which disclaims liability or warranty for this information). If you have questions about a medical condition or this instruction, always ask your healthcare professional. [address] any warranty or liability for your use of this information. Introducing hospitals & HEALTH SERVICES! Dear [facility]: 
Thank you for requesting a [employer] account. Our records indicate that you already have an active [employer] account. You can access your account anytime at [email or web address] Did you know that you can access your hospital and ER discharge instructions at any time in [employer]? You can also review all of your test results from your hospital stay or ER visit. Additional Information If you have questions, please visit the Frequently Asked Questions section of the [employer] website at [email or web address] Remember, [employer] is NOT to be used for urgent needs. For medical emergencies, dial 911. Now available from your iPhone and Android! Introducing Capo Diaz As a [employer] patient, I wanted to make you aware of our electronic visit tool called Capo RobertsalSpanning Cloud Apps. [employer] 24/7 allows you to connect within minutes with a medical provider 24 hours a day, seven days a week via a mobile device or tablet or logging into a secure website from your computer. You can access Capo Grahamfin from anywhere in the [place].  
 
A virtual visit might be right for you when you have a simple condition and feel like you just dont want to get out of bed, or cant get away from work for an appointment, when your regular [employer] provider is not available (evenings, weekends or holidays), or when youre out of town and need minor care. Electronic visits cost only $49 and if the Sal K121 24/JamKazam provider determines a prescription is needed to treat your condition, one can be electronically transmitted to a nearby pharmacy*. Please take a moment to enroll today if you have not already done so. The enrollment process is free and takes just a few minutes. To enroll, please download the [employer]/JamKazam wendy to your tablet or phone, or visit [email or web address] to enroll on your computer. And, as an [address] [place] [address] patient with a [employer] account, the results of your visits will be scanned into your electronic medical record and your primary care provider will be able to view the scanned results. We urge you to continue to see your regular Sal Landaverde provider for your ongoing medical care. And while your primary care provider may not be the one available when you seek a Capo Milk Mantrasalfin virtual visit, the peace of mind you get from getting a real diagnosis real time can be priceless. For more information on Fotoupsalfin, view our Frequently Asked Questions (FAQs) at [email or web address]. Sincerely, 
 
Lukas Quintana MD 
Chief Medical Officer [facility] Kylie Mercedes *:  certain medications cannot be prescribed via Fotoupsalfin Providers Seen During Your Hospitalization Provider Specialty Primary office phone Paty Gonzalez MD Obstetrics & Gynecology [phone] Your Primary Care Physician (PCP) Primary Care Physician Office Phone Office Fax [employer] [phone] You are allergic to the following No active allergies Recent Documentation Height Weight BMI OB Status Smoking Status 1.626 m 73.9 kg 27.98 kg/m2 Pregnant Never Smoker Emergency Contacts Name Discharge Info Relation Home Work Mobile [address] [facility] CAREGIVER [3] Spouse [3] [phone] Patient Belongings The following personal items are in your possession at time of discharge: 
                             
 
  
  
 Please provide this summary of care documentation to your next provider. Signatures-by signing, you are acknowledging that this After Visit Summary has been reviewed with you and you have received a copy. Patient Signature:  ____________________________________________________________ Date:  ____________________________________________________________  
  
Jered Mais Provider Signature:  ____________________________________________________________ Date:  ____________________________________________________________

## 2018-10-02 LAB — GRBS, EXTERNAL: NEGATIVE

## 2018-10-14 ENCOUNTER — ANESTHESIA (OUTPATIENT)
Dept: LABOR AND DELIVERY | Age: 28
End: 2018-10-14
Payer: COMMERCIAL

## 2018-10-14 ENCOUNTER — HOSPITAL ENCOUNTER (INPATIENT)
Age: 28
LOS: 2 days | Discharge: HOME | End: 2018-10-16
Attending: SPECIALIST | Admitting: OBSTETRICS & GYNECOLOGY
Payer: COMMERCIAL

## 2018-10-14 ENCOUNTER — ANESTHESIA EVENT (OUTPATIENT)
Dept: LABOR AND DELIVERY | Age: 28
End: 2018-10-14
Payer: COMMERCIAL

## 2018-10-14 LAB
A1 MICROGLOB PLACENTAL VAG QL: POSITIVE
ABO + RH BLD: NORMAL
BASOPHILS # BLD: 0 K/UL (ref 0–0.1)
BASOPHILS NFR BLD: 0 % (ref 0–2)
BLOOD GROUP ANTIBODIES SERPL: NORMAL
CONTROL LINE PRESENT?: NORMAL
DIFFERENTIAL METHOD BLD: ABNORMAL
EOSINOPHIL # BLD: 0.1 K/UL (ref 0–0.4)
EOSINOPHIL NFR BLD: 1 % (ref 0–5)
ERYTHROCYTE [DISTWIDTH] IN BLOOD BY AUTOMATED COUNT: 14.7 % (ref 11.6–14.5)
EXPIRATION DATE: NORMAL
HCT VFR BLD AUTO: 33.8 % (ref 35–45)
HGB BLD-MCNC: 10.9 G/DL (ref 12–16)
INTERNAL NEGATIVE CONTROL: NORMAL
KIT LOT NO.: NORMAL
LYMPHOCYTES # BLD: 2.2 K/UL (ref 0.9–3.6)
LYMPHOCYTES NFR BLD: 33 % (ref 21–52)
MCH RBC QN AUTO: 29.4 PG (ref 24–34)
MCHC RBC AUTO-ENTMCNC: 32.2 G/DL (ref 31–37)
MCV RBC AUTO: 91.1 FL (ref 74–97)
MONOCYTES # BLD: 0.6 K/UL (ref 0.05–1.2)
MONOCYTES NFR BLD: 9 % (ref 3–10)
NEUTS SEG # BLD: 3.7 K/UL (ref 1.8–8)
NEUTS SEG NFR BLD: 57 % (ref 40–73)
PLATELET # BLD AUTO: 148 K/UL (ref 135–420)
PMV BLD AUTO: 11.6 FL (ref 9.2–11.8)
RBC # BLD AUTO: 3.71 M/UL (ref 4.2–5.3)
SPECIMEN EXP DATE BLD: NORMAL
WBC # BLD AUTO: 6.6 K/UL (ref 4.6–13.2)

## 2018-10-14 PROCEDURE — 84112 EVAL AMNIOTIC FLUID PROTEIN: CPT | Performed by: OBSTETRICS & GYNECOLOGY

## 2018-10-14 PROCEDURE — 00HU33Z INSERTION OF INFUSION DEVICE INTO SPINAL CANAL, PERCUTANEOUS APPROACH: ICD-10-PCS | Performed by: OBSTETRICS & GYNECOLOGY

## 2018-10-14 PROCEDURE — 74011000250 HC RX REV CODE- 250: Performed by: NURSE ANESTHETIST, CERTIFIED REGISTERED

## 2018-10-14 PROCEDURE — 76060000078 HC EPIDURAL ANESTHESIA

## 2018-10-14 PROCEDURE — 75410000003 HC RECOV DEL/VAG/CSECN EA 0.5 HR

## 2018-10-14 PROCEDURE — 74011000250 HC RX REV CODE- 250

## 2018-10-14 PROCEDURE — 86900 BLOOD TYPING SEROLOGIC ABO: CPT | Performed by: OBSTETRICS & GYNECOLOGY

## 2018-10-14 PROCEDURE — 74011250636 HC RX REV CODE- 250/636: Performed by: NURSE ANESTHETIST, CERTIFIED REGISTERED

## 2018-10-14 PROCEDURE — 74011250637 HC RX REV CODE- 250/637: Performed by: OBSTETRICS & GYNECOLOGY

## 2018-10-14 PROCEDURE — 3E0R3BZ INTRODUCTION OF ANESTHETIC AGENT INTO SPINAL CANAL, PERCUTANEOUS APPROACH: ICD-10-PCS | Performed by: OBSTETRICS & GYNECOLOGY

## 2018-10-14 PROCEDURE — 85025 COMPLETE CBC W/AUTO DIFF WBC: CPT | Performed by: OBSTETRICS & GYNECOLOGY

## 2018-10-14 PROCEDURE — 74011250636 HC RX REV CODE- 250/636

## 2018-10-14 PROCEDURE — 74011250636 HC RX REV CODE- 250/636: Performed by: OBSTETRICS & GYNECOLOGY

## 2018-10-14 PROCEDURE — 77030008703 HC TU ET UNCUF COVD -A

## 2018-10-14 PROCEDURE — 74011250637 HC RX REV CODE- 250/637

## 2018-10-14 PROCEDURE — 75410000002 HC LABOR FEE PER 1 HR

## 2018-10-14 PROCEDURE — 77030007879 HC KT SPN EPDRL TELE -B: Performed by: NURSE ANESTHETIST, CERTIFIED REGISTERED

## 2018-10-14 PROCEDURE — 65270000029 HC RM PRIVATE

## 2018-10-14 PROCEDURE — 75410000000 HC DELIVERY VAGINAL/SINGLE

## 2018-10-14 RX ORDER — SODIUM CHLORIDE, SODIUM LACTATE, POTASSIUM CHLORIDE, CALCIUM CHLORIDE 600; 310; 30; 20 MG/100ML; MG/100ML; MG/100ML; MG/100ML
125 INJECTION, SOLUTION INTRAVENOUS CONTINUOUS
Status: DISCONTINUED | OUTPATIENT
Start: 2018-10-14 | End: 2018-10-14

## 2018-10-14 RX ORDER — ROPIVACAINE HYDROCHLORIDE 2 MG/ML
INJECTION, SOLUTION EPIDURAL; INFILTRATION; PERINEURAL AS NEEDED
Status: DISCONTINUED | OUTPATIENT
Start: 2018-10-14 | End: 2018-10-14 | Stop reason: HOSPADM

## 2018-10-14 RX ORDER — LIDOCAINE HYDROCHLORIDE AND EPINEPHRINE 15; 5 MG/ML; UG/ML
INJECTION, SOLUTION EPIDURAL AS NEEDED
Status: DISCONTINUED | OUTPATIENT
Start: 2018-10-14 | End: 2018-10-14 | Stop reason: HOSPADM

## 2018-10-14 RX ORDER — PROMETHAZINE HYDROCHLORIDE 25 MG/ML
25 INJECTION, SOLUTION INTRAMUSCULAR; INTRAVENOUS
Status: DISCONTINUED | OUTPATIENT
Start: 2018-10-14 | End: 2018-10-16 | Stop reason: HOSPADM

## 2018-10-14 RX ORDER — IBUPROFEN 400 MG/1
800 TABLET ORAL
Status: DISCONTINUED | OUTPATIENT
Start: 2018-10-14 | End: 2018-10-16 | Stop reason: HOSPADM

## 2018-10-14 RX ORDER — MAG HYDROX/ALUMINUM HYD/SIMETH 200-200-20
15 SUSPENSION, ORAL (FINAL DOSE FORM) ORAL
Status: DISCONTINUED | OUTPATIENT
Start: 2018-10-14 | End: 2018-10-14

## 2018-10-14 RX ORDER — ACETAMINOPHEN 325 MG/1
650 TABLET ORAL
Status: DISCONTINUED | OUTPATIENT
Start: 2018-10-14 | End: 2018-10-16 | Stop reason: HOSPADM

## 2018-10-14 RX ORDER — OXYTOCIN/RINGER'S LACTATE 20/1000 ML
125 PLASTIC BAG, INJECTION (ML) INTRAVENOUS CONTINUOUS
Status: DISCONTINUED | OUTPATIENT
Start: 2018-10-14 | End: 2018-10-14

## 2018-10-14 RX ORDER — CARBOPROST TROMETHAMINE 250 UG/ML
250 INJECTION, SOLUTION INTRAMUSCULAR
Status: DISCONTINUED | OUTPATIENT
Start: 2018-10-14 | End: 2018-10-14

## 2018-10-14 RX ORDER — NALBUPHINE HYDROCHLORIDE 10 MG/ML
10 INJECTION, SOLUTION INTRAMUSCULAR; INTRAVENOUS; SUBCUTANEOUS
Status: DISCONTINUED | OUTPATIENT
Start: 2018-10-14 | End: 2018-10-14

## 2018-10-14 RX ORDER — OXYTOCIN/0.9 % SODIUM CHLORIDE 30/500 ML
PLASTIC BAG, INJECTION (ML) INTRAVENOUS
Status: DISCONTINUED
Start: 2018-10-14 | End: 2018-10-14

## 2018-10-14 RX ORDER — OXYCODONE AND ACETAMINOPHEN 5; 325 MG/1; MG/1
2 TABLET ORAL
Status: DISCONTINUED | OUTPATIENT
Start: 2018-10-14 | End: 2018-10-16 | Stop reason: HOSPADM

## 2018-10-14 RX ORDER — MISOPROSTOL 200 UG/1
800 TABLET ORAL ONCE
Status: COMPLETED | OUTPATIENT
Start: 2018-10-14 | End: 2018-10-14

## 2018-10-14 RX ORDER — HYDROCORTISONE 25 MG/G
CREAM TOPICAL AS NEEDED
Status: DISCONTINUED | OUTPATIENT
Start: 2018-10-14 | End: 2018-10-16 | Stop reason: HOSPADM

## 2018-10-14 RX ORDER — METHYLERGONOVINE MALEATE 0.2 MG/ML
0.2 INJECTION INTRAVENOUS AS NEEDED
Status: DISCONTINUED | OUTPATIENT
Start: 2018-10-14 | End: 2018-10-14

## 2018-10-14 RX ORDER — AMOXICILLIN 250 MG
1 CAPSULE ORAL
Status: DISCONTINUED | OUTPATIENT
Start: 2018-10-14 | End: 2018-10-16 | Stop reason: HOSPADM

## 2018-10-14 RX ORDER — OXYTOCIN/RINGER'S LACTATE 20/1000 ML
500 PLASTIC BAG, INJECTION (ML) INTRAVENOUS ONCE
Status: ACTIVE | OUTPATIENT
Start: 2018-10-14 | End: 2018-10-14

## 2018-10-14 RX ORDER — ONDANSETRON 2 MG/ML
4 INJECTION INTRAMUSCULAR; INTRAVENOUS
Status: DISCONTINUED | OUTPATIENT
Start: 2018-10-14 | End: 2018-10-14

## 2018-10-14 RX ORDER — ZOLPIDEM TARTRATE 5 MG/1
5 TABLET ORAL
Status: DISCONTINUED | OUTPATIENT
Start: 2018-10-14 | End: 2018-10-16 | Stop reason: HOSPADM

## 2018-10-14 RX ORDER — RANITIDINE 150 MG/1
150 TABLET, FILM COATED ORAL 2 TIMES DAILY
COMMUNITY

## 2018-10-14 RX ORDER — SALICYLIC ACID
POWDER (GRAM) MISCELLANEOUS
Status: DISCONTINUED
Start: 2018-10-14 | End: 2018-10-14

## 2018-10-14 RX ORDER — HYDROMORPHONE HYDROCHLORIDE 1 MG/ML
1 INJECTION, SOLUTION INTRAMUSCULAR; INTRAVENOUS; SUBCUTANEOUS
Status: DISCONTINUED | OUTPATIENT
Start: 2018-10-14 | End: 2018-10-14

## 2018-10-14 RX ORDER — MISOPROSTOL 200 UG/1
800 TABLET ORAL
Status: DISCONTINUED | OUTPATIENT
Start: 2018-10-14 | End: 2018-10-14

## 2018-10-14 RX ORDER — FENTANYL CITRATE 50 UG/ML
100 INJECTION, SOLUTION INTRAMUSCULAR; INTRAVENOUS ONCE
Status: COMPLETED | OUTPATIENT
Start: 2018-10-14 | End: 2018-10-14

## 2018-10-14 RX ORDER — PHENYLEPHRINE HCL IN 0.9% NACL 0.4MG/10ML
80 SYRINGE (ML) INTRAVENOUS AS NEEDED
Status: DISCONTINUED | OUTPATIENT
Start: 2018-10-14 | End: 2018-10-14

## 2018-10-14 RX ORDER — OXYTOCIN 10 [USP'U]/ML
10 INJECTION, SOLUTION INTRAMUSCULAR; INTRAVENOUS
Status: DISCONTINUED | OUTPATIENT
Start: 2018-10-14 | End: 2018-10-14

## 2018-10-14 RX ORDER — LIDOCAINE HYDROCHLORIDE 10 MG/ML
30 INJECTION, SOLUTION EPIDURAL; INFILTRATION; INTRACAUDAL; PERINEURAL AS NEEDED
Status: DISCONTINUED | OUTPATIENT
Start: 2018-10-14 | End: 2018-10-14

## 2018-10-14 RX ORDER — EPHEDRINE SULFATE 50 MG/ML
10 INJECTION, SOLUTION INTRAVENOUS AS NEEDED
Status: COMPLETED | OUTPATIENT
Start: 2018-10-14 | End: 2018-10-14

## 2018-10-14 RX ORDER — TERBUTALINE SULFATE 1 MG/ML
0.25 INJECTION SUBCUTANEOUS
Status: DISCONTINUED | OUTPATIENT
Start: 2018-10-14 | End: 2018-10-14

## 2018-10-14 RX ORDER — OXYTOCIN/0.9 % SODIUM CHLORIDE 30/500 ML
0-20 PLASTIC BAG, INJECTION (ML) INTRAVENOUS
Status: DISCONTINUED | OUTPATIENT
Start: 2018-10-14 | End: 2018-10-14

## 2018-10-14 RX ORDER — FENTANYL CITRATE 50 UG/ML
INJECTION, SOLUTION INTRAMUSCULAR; INTRAVENOUS
Status: COMPLETED
Start: 2018-10-14 | End: 2018-10-14

## 2018-10-14 RX ORDER — LANOLIN ALCOHOL/MO/W.PET/CERES
CREAM (GRAM) TOPICAL
COMMUNITY

## 2018-10-14 RX ORDER — MISOPROSTOL 200 UG/1
TABLET ORAL
Status: COMPLETED
Start: 2018-10-14 | End: 2018-10-14

## 2018-10-14 RX ADMIN — EPHEDRINE SULFATE 5 MG: 50 INJECTION, SOLUTION INTRAVENOUS at 08:28

## 2018-10-14 RX ADMIN — SODIUM CHLORIDE, SODIUM LACTATE, POTASSIUM CHLORIDE, AND CALCIUM CHLORIDE 125 ML/HR: 600; 310; 30; 20 INJECTION, SOLUTION INTRAVENOUS at 09:50

## 2018-10-14 RX ADMIN — SODIUM CHLORIDE, SODIUM LACTATE, POTASSIUM CHLORIDE, AND CALCIUM CHLORIDE 125 ML/HR: 600; 310; 30; 20 INJECTION, SOLUTION INTRAVENOUS at 07:40

## 2018-10-14 RX ADMIN — MISOPROSTOL 800 MCG: 200 TABLET ORAL at 20:30

## 2018-10-14 RX ADMIN — ZOLPIDEM TARTRATE 5 MG: 5 TABLET ORAL at 21:53

## 2018-10-14 RX ADMIN — LIDOCAINE HYDROCHLORIDE AND EPINEPHRINE 3 ML: 15; 5 INJECTION, SOLUTION EPIDURAL at 07:49

## 2018-10-14 RX ADMIN — OXYTOCIN 2 MILLI-UNITS/MIN: 10 INJECTION, SOLUTION INTRAMUSCULAR; INTRAVENOUS at 12:35

## 2018-10-14 RX ADMIN — Medication 125 ML/HR: at 18:22

## 2018-10-14 RX ADMIN — EPHEDRINE SULFATE 10 MG: 50 INJECTION, SOLUTION INTRAVENOUS at 08:56

## 2018-10-14 RX ADMIN — FENTANYL CITRATE 100 MCG: 50 INJECTION, SOLUTION INTRAMUSCULAR; INTRAVENOUS at 08:00

## 2018-10-14 RX ADMIN — ROPIVACAINE HYDROCHLORIDE 5 ML: 2 INJECTION, SOLUTION EPIDURAL; INFILTRATION; PERINEURAL at 07:55

## 2018-10-14 RX ADMIN — SODIUM CHLORIDE, SODIUM LACTATE, POTASSIUM CHLORIDE, AND CALCIUM CHLORIDE 125 ML/HR: 600; 310; 30; 20 INJECTION, SOLUTION INTRAVENOUS at 08:49

## 2018-10-14 RX ADMIN — EPHEDRINE SULFATE 10 MG: 50 INJECTION, SOLUTION INTRAVENOUS at 09:28

## 2018-10-14 RX ADMIN — SODIUM CHLORIDE, SODIUM LACTATE, POTASSIUM CHLORIDE, AND CALCIUM CHLORIDE 125 ML/HR: 600; 310; 30; 20 INJECTION, SOLUTION INTRAVENOUS at 14:22

## 2018-10-14 RX ADMIN — IBUPROFEN 800 MG: 400 TABLET ORAL at 19:15

## 2018-10-14 RX ADMIN — Medication 10 ML/HR: at 07:57

## 2018-10-14 RX ADMIN — SODIUM CHLORIDE, SODIUM LACTATE, POTASSIUM CHLORIDE, AND CALCIUM CHLORIDE 1000 ML: 600; 310; 30; 20 INJECTION, SOLUTION INTRAVENOUS at 07:00

## 2018-10-14 NOTE — ROUTINE PROCESS
0410 Patient arrived to unit ambulatory, accompanied by FOB with c/o ROM @ 0300. Patient denies any vaginal bleeding. Patient has been given gown and placed on TOCO and FHR monitoring. [address] Dr. Robbie Trejo, report given as follows: 27y/o patient of Dr. Jodi Rogers, , 36w5d, GBS-negative, presents with ROM, Amnisure is positive. Prenatal records states, mild polyhydramnios, tachycardia in pregnancy-patient seeing cardiology, LGA 98%, patient stop taking Adderall at 20 weeks, patient has not started Valtrex for treatment of HSV-states she has had no outbreaks. MD verbalized understanding, order given to admit patient for labor. 9293  at bedside, to assess patient, speculum exam performed. 5306 Patient requests cervical exam, Dr. Robbie Trejo at bedside, SVE 4-5//80/-2. 
2691 Bedside and Verbal shift change report given to Azael Membreno RN (oncoming nurse) by Hansel Sainz RN (offgoing nurse). Report included the following information SBAR, Kardex, Intake/Output, MAR and Recent Results.

## 2018-10-14 NOTE — PROGRESS NOTES
OB Progress Note S: Pt reports pain worsening. Wants check O: Patient Vitals for the past 4 hrs: 
 Temp Pulse Resp BP  
10/14/18 0607 - 85 - 107/71  
10/14/18 0411 97.9 °F (36.6 °C) 87 16 114/67 VE: 4-5/90/-2 posterior TOCO: q 5 minutes FHT: category 1 A/P: IUP at  [id number], SROM labor progressing quickly. Desires epidural.  
         
       
Cielo Field MD 
October 14, 2018

## 2018-10-14 NOTE — L&D DELIVERY NOTE
Delivery Summary    Patient: Kushal Katz MRN: [id number]  SSN: [id number]    YOB: 1990  Age: 25 y.o. Sex: female        Labor Events:    Labor: No    Rupture Date: 10/14/2018    Rupture Time: 3:00 AM    Rupture Type: SROM    Amniotic Fluid Volume: Moderate     Amniotic Fluid Description:  Amniotic Fluid Odor: Clear          Induction: None         Induction Date:        Induction Time:       Indications for Induction:       Augmentation: Oxytocin    Augmentation Date:      Augmentation Time:      Indications for Augmentation: Ineffective Contraction Pattern    Cervical Ripening:       None    Rupture Identifier:       Labor complications: None     Additional complications:           Delivery Events:  Episiotomy: None    Indications for Episiotomy:      Laceration(s): None       Repaired: None     Number of Repair Packets:      Suture Type and Size: None        Estimated Blood Loss (ml):          Information for the patient's :  Driss Orozco [[id number]]     Delivery Summary - Baby    Delivery Date: 10/14/2018   Delivery Time: 5:47 PM   Delivery Type: Vaginal, Spontaneous Delivery  Sex:  male  Gestational Age: 44w9d  Delivery Clinician:  Lucie Varma  Living?: Living   Delivery Location: L&D             APGARS  One minute Five minutes Ten minutes   Skin Color: 0    0       Heart Rate: 2   2         Reflex Irritability: 2   2         Muscle Tone: 1   1       Respiration: 2   2         Total: 7   7           Presentation: Vertex  Position:   Occiput Anterior  Resuscitation Method:        Meconium Stained:      Cord Information: 3 Vessels   Complications: None  Cord Blood Sent?:  Yes    Blood Gases Sent?:  No    Placenta:  Date/Time: 10/14  5:54 PM  Removal: Spontaneous      Appearance: Normal;Intact      Measurements:  Birth Weight:      Birth Length:     Head Circumference:       Chest Circumference:      Abdominal Girth:       Other Providers:   [place] R;CHRISTIANNE MEDRANO;MILEY PHILLIPS;;MARIA ESTHER COSTELLO;;HALEIGH SRIVASTAVA Obstetrician;Primary Nurse;Primary  Nurse;Nicu Nurse;Pediatrician; Anesthesiologist;Crna;Nurse Practitioner;Midwife;Nursery Nurse         Pt pushed well and delivered via  live male infant and brought to maternal abdomen. Infant crying vigorously. Cord clamped post end of pulsation and cut. Placenta delivered spontaneously intact with 3 vessels. Peds present and brought infant to warmer. Intact perineum. Mom tolerated well.      Avery Knox MD  2018

## 2018-10-14 NOTE — ROUTINE PROCESS
Assumed care of pt from [facility], [facility]. Pt resting in bed, receiving bolus for epidural.  Pt awaiting epidural but has no other questions/concerns. 3509 - paged anesthesia 
0730 - anesthesia on unit 5023 - anesthesia time out. This RN at bedside 
0800 - anesthesia out of room; procedure finished 
[id number] - This RN called out to nurses station via call bell for ephedrine, pt reports feeling lightheaded Glenda Davis, RN in room to see what was needed, this RN asked for ephedrine and anesthesia. LR bolus has been running on pt since this RN came on shift. Pt has had 1.5L at this time with bolus continuing to run. 
8813 - Tru Jacobsen CRNA in room. 5mg of ephedrine given via IV push and epidural pump paused. This RN continues to stay at bedside. 0461 - BP starting to recover and pt remarks that lightheadedness is getting better. This RN remains at bedside. 0845 - hypotension continues be borderline for another ephedrine dose but pt denies symptoms. 0114 - 10mg of ephedrine given to treat BP of 93/54 and 94/52. Bolus continuing to run. This RN remains at bedside and epidural pump remains paused 
0912 - This RN out of room. Pt instructed to call out if she feels any different than she does right now. This RN will continue to monitor pt closely. 1005 - SVE by ANASTACIO Goldman, 7/90/-1. Arevalo inserted 1009 - Pt moved to right sided lying position with peanut ball between legs [address] [facility],[address], CRNA in room to evaluate patient. Epidural decreased from 10ml/h to 8ml/hr per verbal order from Blanket. 
355 [facility] by ANASTACIO Ernandez, RN; 9/90/-1. Pt moved from side lying to high semi fowlers. 1200 - Rounded on pt. She is comfortable with no questions/concerns. Will continue to monitor pt closely. 46 - Dr. Andrea Tay at bedside to evaluate pt. SVE lip/100/0. Plan is to start pt on 2mu/m of pitocin and labor baby down 1335 - SVE by ANASTACIO Ernandez, RN 10/100/0.   Did a trial push during contraction; baby did not move down at all during contraction or trial push. Pt turned to left side lying position with peanut ball between legs. 56 - Dr. Lianne Chauhan called for an update on pt. This RN notified her know pt was not able to move baby while pushing during a contraction. Dr. Lianne Chauhan requested the epidural be decreased from 8 to 6. Epidural decreased. Will reeval pt pushing shortly. 1535 - Pt moved to high semi fowlers. Pt has no questions/concerns but notices increased pressure. SVE 10/100/0 to +1. 
1558 - pt started pushing and heredia removed with 500mL yellow urine 46 - birth 
80 - placenta 1910 - Bedside and Verbal shift change report given to Raheem RN (oncoming nurse) by Yehuda Chan RN (offgoing nurse). Report included the following information SBAR, Procedure Summary, MAR, Recent Results and Med Rec Status.

## 2018-10-14 NOTE — H&P
Yamila Chew MD Physician Signed Obstetrics & Gynecology Progress Notes Date of Service: 10/14/18 6501 History & Physical 
  
Name: Urban Rocha MRN: [id number]  SSN: [id number] YOB: 1990  Age: 25 y.o. Sex: female   
  
  
Subjective:  
  
Estimated Date of Delivery: 18 OB History  Para Term  AB Living  
  2 1 1     1 SAB TAB Ectopic Molar Multiple Live Births  
          0 1  
  
  
  
Ms. Pham Tse is admitted with pregnancy at 36w5d for SROM clear fluid this AM at 3. Since then has had beginnings of ctx. . Prenatal course was normal.Prenatal care has been followed by [facility]. Please see prenatal records for details. 
  
    
Past Medical History:  
Diagnosis Date  ADD (attention deficit disorder)    
 Conjunctival cyst of left eye 2017  Nausea & vomiting    
  
     
Past Surgical History:  
Procedure Laterality Date  HX WISDOM TEETH EXTRACTION      
  
Social History  
  
   
Occupational History  Not on file.  
  
     
Social History Main Topics  Smoking status: Never Smoker  Smokeless tobacco: Never Used  Alcohol use No  
 Drug use: No  
 Sexual activity: Yes  
    Partners: Male  
  
No family history on file. 
  
No Known Allergies Prior to Admission medications Medication Sig Start Date End Date Taking? Authorizing Provider  
raNITIdine (ZANTAC) 150 mg tablet Take 150 mg by mouth two (2) times a day.     Yes Historical Provider  
[id number] cornelia-folic-dha (PRENATAL DHA+COMPLETE PRENATAL) -300 mg-mcg-mg cmpk Take  by mouth. Indications: pregnancy     Yes Historical Provider  
ferrous sulfate (IRON) 325 mg (65 mg iron) tablet Take  by mouth Daily (before breakfast).     Yes Historical Provider DEXTROAMPHETAMINE/AMPHETAMINE (ADDERALL PO) Take  by mouth.       Historical Provider  
  
  
Review of Systems: Pertinent items are noted in HPI. 
  
Objective:  
  
Vitals: Vitals:  
  10/14/18 0411 10/14/18 5774 BP: 114/67 107/71 Pulse: 87 85 Resp: 16    
Temp: 97.9 °F (36.6 °C)    
  
  
Physical Exam: 
Patient without distress. Abdomen: soft, nontender Fundus: soft and non tender Perineum: blood absent, amniotic fluid absent Sterile Speculum Exam:  No lesions seen externally or internally. Cervical Exam: 2/80/-3 per RN Lower Extremities:  - Edema 1+ Membranes: SROM clear, Amnisure positive Fetal Heart Rate: Baseline: 150 per minute Variability: moderate Accelerations: yes Decelerations: none Uterine contractions: regular, every 4-5 minutes 
  
Prenatal Labs:  
     
Lab Results Component Value Date/Time  
  Rubella, External immune 09/30/2015  
  GrBStrep, External negative  03/29/2016  
  HBsAg, External non reactive 09/30/2015  
  HIV, External non reactive 09/30/2015  
  RPR, External non reactive 09/30/2015  
  
  
  
Assessment/Plan:  
  
Plan: Admit for Labor  Continue expectant management, Continue plan for vaginal delivery. Pt with RTUS in office and had polyhydramnios and macrosomia with EFW of 8 pounds 13 ounces. Pt with history of large term baby. Pt with h/o HSV on blood work and no history of lesions. Not on suppression. Pelvic exam with speculum revealed no pelvic lesions. Group B Strep was negative. 
  
Signed By:  Yuriy Gomez MD   
  October 14, 2018

## 2018-10-14 NOTE — PROGRESS NOTES
OB Progress Note S: Pt reports pain with ctx as turned epidural down. O: Patient Vitals for the past 4 hrs: 
 Temp Pulse Resp BP SpO2  
10/14/18 1420 - 92 - 98/52 -  
10/14/18 1417 - - - - 90 % 10/14/18 1415 - 85 - 99/54 98 % 10/14/18 1413 97.8 °F (36.6 °C) 85 20 99/54 98 % 10/14/18 1410 - 85 - 94/47 98 % 10/14/18 1405 - 83 - 94/53 -  
10/14/18 1403 - - - - 97 % 10/14/18 1400 - 88 - 97/51 -  
10/14/18 1358 - - - - 96 % 10/14/18 1355 - 86 - 92/60 -  
10/14/18 1353 - - - - 95 % 10/14/18 1352 - 83 - 99/50 -  
10/14/18 1351 - - - - 99 % 10/14/18 1350 - 91 - (!) 89/47 -  
10/14/18 1346 - - - - 99 % 10/14/18 1345 - 85 - - -  
10/14/18 1341 - - - - 98 % 10/14/18 1336 - - - - 99 % 10/14/18 1331 - - - - 98 % 10/14/18 1330 - 96 - 96/56 -  
10/14/18 1326 - - - - 97 % 10/14/18 1325 - - - - 97 % 10/14/18 1320 - - - - 99 % 10/14/18 1315 - 97 - 94/51 96 % 10/14/18 1310 - - - - 98 % 10/14/18 1305 - - - - 99 % 10/14/18 1300 - 96 - 98/54 97 % 10/14/18 1255 - - - - 95 % 10/14/18 1253 - - - - 97 % 10/14/18 1248 - - - - 98 % 10/14/18 1245 - 91 - 104/55 -  
10/14/18 1243 - - - - 98 % 10/14/18 1238 - - - - 98 % 10/14/18 1233 - - - - 98 % 10/14/18 1230 - 85 - 100/53 -  
   
     VE: BBOW palpated. AROM of forebag, clear fluid. C/C/0  Labia edematous TOCO: q 2 minutes FHT: Category 1 A/P: IUP at  [id number],  Fully dilated with no descent. Forebag ruptured. Only on 2 mu of pitocin until an hour ago. DW nurse need to increase pitocin to increase force of ctx to help with descent. Worked with pt with pushing for 15 minutes some descent, but feels asynclitic and big baby 0- EFW 8.5-9.0 pounds. Will continue with second stage and keep epidural at 6 and increase pitocin as tolerated. Suhas Knapp MD 
October 14, 2018

## 2018-10-14 NOTE — PROGRESS NOTES
History & Physical 
 
Name: Lorenzo Gordon MRN: [id number]  SSN: [id number] YOB: 1990  Age: 25 y.o. Sex: female Subjective:  
 
Estimated Date of Delivery: 18 OB History  Para Term  AB Living 2 1 1   1 SAB TAB Ectopic Molar Multiple Live Births 0 1 MsCarmencita Hagan is admitted with pregnancy at 36w5d for SROM clear fluid this AM at 3. Since then has had beginnings of ctx. . Prenatal course was normal.Prenatal care has been followed by [facility]. Please see prenatal records for details. Past Medical History:  
Diagnosis Date  ADD (attention deficit disorder)  Conjunctival cyst of left eye 2017  Nausea & vomiting Past Surgical History:  
Procedure Laterality Date  HX WISDOM TEETH EXTRACTION Social History Occupational History  Not on file. Social History Main Topics  Smoking status: Never Smoker  Smokeless tobacco: Never Used  Alcohol use No  
 Drug use: No  
 Sexual activity: Yes  
  Partners: Male No family history on file. No Known Allergies Prior to Admission medications Medication Sig Start Date End Date Taking? Authorizing Provider  
raNITIdine (ZANTAC) 150 mg tablet Take 150 mg by mouth two (2) times a day. Yes Historical Provider  
[id number] cornelia-folic-dha (PRENATAL DHA+COMPLETE PRENATAL) [id number] mg-mcg-mg cmpk Take  by mouth. Indications: pregnancy   Yes Historical Provider  
ferrous sulfate (IRON) 325 mg (65 mg iron) tablet Take  by mouth Daily (before breakfast). Yes Historical Provider DEXTROAMPHETAMINE/AMPHETAMINE (ADDERALL PO) Take  by mouth. Historical Provider Review of Systems: Pertinent items are noted in HPI. Objective:  
 
Vitals: 
Vitals:  
 10/14/18 0411 10/14/18 3738 BP: 114/67 107/71 Pulse: 87 85 Resp: 16 Temp: 97.9 °F (36.6 °C) Physical Exam: 
Patient without distress. Abdomen: soft, nontender Fundus: soft and non tender Perineum: blood absent, amniotic fluid absent Sterile Speculum Exam:  No lesions seen externally or internally. Cervical Exam: 2/80/-3 per RN Lower Extremities:  - Edema 1+ Membranes: SROM clear, Amnisure positive Fetal Heart Rate: Baseline: 150 per minute Variability: moderate Accelerations: yes Decelerations: none Uterine contractions: regular, every 4-5 minutes Prenatal Labs:  
Lab Results Component Value Date/Time  
 Rubella, External immune 09/30/2015 GrBStrep, External negative  03/29/2016 HBsAg, External non reactive 09/30/2015 HIV, External non reactive 09/30/2015 RPR, External non reactive 09/30/2015 Assessment/Plan:  
 
Plan: Admit for Labor  Continue expectant management, Continue plan for vaginal delivery. Pt with RTUS in office and had polyhydramnios and macrosomia with EFW of 8 pounds 13 ounces. Pt with history of large term baby. Pt with h/o HSV on blood work and no history of lesions. Not on suppression. Pelvic exam with speculum revealed no pelvic lesions. Group B Strep was negative. Signed By:  Anuja Garnica MD   
 October 14, 2018

## 2018-10-14 NOTE — ANESTHESIA PROCEDURE NOTES
Epidural Block Start time: 10/14/2018 7:32 AM 
End time: 10/14/2018 7:56 AM 
Performed by: Emerald Wayne Authorized by: Emerald Wayne  
 
Pre-Procedure Indication: labor epidural   
Preanesthetic Checklist: patient identified, risks and benefits discussed, anesthesia consent, patient being monitored, timeout performed and anesthesia consent Timeout Time: 07:37 Epidural:  
Patient position:  Seated Prep region:  Lumbar Prep: Betadine and Patient draped Prep comment:  X3 
Location:  L3-4 Needle and Epidural Catheter:  
Needle Type:  Tuohy Needle Gauge:  18 G Injection Technique:  Loss of resistance using saline Attempts:  1 Catheter Size:  20 G Catheter at Skin Depth (cm):  13 Depth in Epidural Space (cm):  7 Events: no blood with aspiration, no cerebrospinal fluid with aspiration, no paresthesia and negative aspiration test   
Test Dose:  Lidocaine 1.5% w/ epi and negative (3cc's @ 7038) Assessment:  
Catheter Secured:  Tape and tegaderm Insertion:  Uncomplicated Patient tolerance:  Patient tolerated the procedure well with no immediate complications Fentanyl 50 + 50 mcg via BHAVIK @ 8814

## 2018-10-14 NOTE — PROGRESS NOTES
OB Progress Note S: Pt reports being comfortable with some pressure. O: Patient Vitals for the past 4 hrs: 
 Temp Pulse Resp BP SpO2  
10/14/18 1208 - - - - 98 % 10/14/18 1203 - - - - 98 % 10/14/18 1200 - 90 - 99/60 -  
10/14/18 1158 - - - - 98 % 10/14/18 1153 - - - - 98 % 10/14/18 1148 - - - - 98 % 10/14/18 1145 - 85 - 101/60 -  
10/14/18 1143 - - - - 98 % 10/14/18 1138 - - - - 99 % 10/14/18 1133 - - - - 100 % 10/14/18 1130 - 88 - 99/57 -  
10/14/18 1128 - - - - 100 % 10/14/18 1123 - - - - 100 % 10/14/18 1118 - - - - 100 % 10/14/18 1115 - 90 - 103/66 -  
10/14/18 1113 - - - - 99 % 10/14/18 1108 - - - - 100 % 10/14/18 1103 - - - - 99 % 10/14/18 1100 - 90 - 109/63 -  
10/14/18 1058 - - - - 99 % 10/14/18 1053 - - - - 100 % 10/14/18 1048 - - - - 100 % 10/14/18 1045 - 83 - 109/73 -  
10/14/18 1043 - - - - 99 % 10/14/18 1038 - - - - 99 % 10/14/18 1033 - - - - 98 % 10/14/18 1031 - 87 - 104/62 -  
10/14/18 1030 - 89 - 108/61 -  
10/14/18 1028 - - - - 100 % 10/14/18 1027 - 92 - 107/58 -  
10/14/18 1024 - 90 - 112/66 -  
10/14/18 1023 - - - - 100 % 10/14/18 1021 - 96 - 112/65 -  
10/14/18 1018 - 89 - 110/63 100 % 10/14/18 1015 - 91 - (!) 85/64 -  
10/14/18 1013 - 82 - - 100 % 10/14/18 1012 - - - 114/61 -  
10/14/18 1009 - 81 - 99/64 -  
10/14/18 1008 - - - - 100 % 10/14/18 1007 - 85 - - -  
10/14/18 1005 - 85 - - -  
10/14/18 1004 - (!) 102 - - -  
10/14/18 1003 - - - - 100 % 10/14/18 1001 - 92 - 107/57 -  
10/14/18 0958 - 92 - 102/59 99 % 10/14/18 0954 - 88 - 98/53 -  
10/14/18 0953 - - - - 100 % 10/14/18 0951 - 90 - 104/59 -  
10/14/18 0948 - 90 - 104/54 100 % 10/14/18 0945 - 90 - 104/59 -  
10/14/18 0943 - - - - 99 % 10/14/18 0942 - 88 - 99/52 -  
10/14/18 0941 - 85 - 99/58 -  
10/14/18 0939 - 85 - (!) 89/43 -  
10/14/18 0938 - - - - 99 % 10/14/18 0936 - 87 - (!) 86/48 -  
10/14/18 0933 - 87 - 97/46 100 % 10/14/18 0930 - 69 - 97/51 -  
 10/14/18 0927 - 79 - (!) 87/48 -  
10/14/18 0924 - 81 - (!) 83/39 99 % 10/14/18 0922 - 78 - (!) 83/41 -  
10/14/18 0920 - 76 - (!) 84/38 -  
10/14/18 0919 - - - - 100 % 10/14/18 0918 - 82 - 90/70 -  
10/14/18 0916 - 81 - 101/45 -  
10/14/18 0914 - 82 - 96/51 96 % 10/14/18 0912 - 78 - 100/55 -  
10/14/18 0910 - 77 - 95/53 -  
10/14/18 0909 - - - - 98 % 10/14/18 0908 - 83 - 101/51 -  
10/14/18 0906 - 79 - 99/49 -  
10/14/18 0904 - 82 - 100/45 97 % 10/14/18 0902 - 80 - 100/48 -  
10/14/18 0900 97.3 °F (36.3 °C) 87 20 106/56 -  
10/14/18 0859 - 84 - - 98 % 10/14/18 0858 - - - 97/60 -  
10/14/18 0856 - 76 - 94/52 -  
10/14/18 0854 - 74 - 93/54 97 % 10/14/18 0852 - 75 - 101/48 -  
10/14/18 0850 - 78 - 98/50 -  
10/14/18 0849 - - - - 98 % 10/14/18 0848 - 76 - 97/49 -  
10/14/18 0846 - 76 - 98/48 -  
10/14/18 0844 - 78 - 95/49 100 % 10/14/18 0842 - 79 - 97/50 -  
10/14/18 0841 - 80 - 97/48 -  
10/14/18 0839 - - - - 100 % VE: ant lip.0 station TOCO: q 4 minutes FHT: Category 1 A/P: IUP at  [id number] slow progression. Will add some pitocin to help with progression. Antonia Naik MD 
October 14, 2018

## 2018-10-14 NOTE — ANESTHESIA PREPROCEDURE EVALUATION
Anesthetic History No history of anesthetic complications Review of Systems / Medical History Patient summary reviewed, nursing notes reviewed and pertinent labs reviewed Pulmonary Within defined limits Neuro/Psych Within defined limits Cardiovascular Within defined limits GI/Hepatic/Renal 
Within defined limits Endo/Other Within defined limits Other Findings Physical Exam 
 
Airway Mallampati: I 
TM Distance: 4 - 6 cm Neck ROM: normal range of motion Mouth opening: Normal 
 
 Cardiovascular Regular rate and rhythm,  S1 and S2 normal,  no murmur, click, rub, or gallop Dental 
No notable dental hx Pulmonary Breath sounds clear to auscultation Abdominal 
GI exam deferred Other Findings Anesthetic Plan ASA: 2 Anesthesia type: epidural 
 
 
 
 
 
Anesthetic plan and risks discussed with: Patient FOB

## 2018-10-14 NOTE — IP AVS SNAPSHOT
[address] Patient: Kerrie Bowie MRN: [id number] IKJ:[id number] About your hospitalization You were admitted on:  October 14, 2018 You last received care in the:  [place] [address] You were discharged on:  October 16, 2018 Why you were hospitalized Your primary diagnosis was:  Not on File Your diagnoses also included:  Labor And Delivery, Indication For Care Follow-up Information Follow up With Details Comments Contact Info Ricardo Lozano MD   [address] [place] [address] [facility] [address] 
[phone] Discharge Orders None A check bk indicates which time of day the medication should be taken. My Medications START taking these medications Instructions Each Dose to Equal  
 Morning Noon Evening Bedtime  
 ibuprofen 800 mg tablet Commonly known as:  MOTRIN Your last dose was: Your next dose is: Take 1 Tab by mouth every eight (8) hours as needed for up to 30 doses. 800 mg CONTINUE taking these medications Instructions Each Dose to Equal  
 Morning Noon Evening Bedtime ADDERALL PO Your last dose was: Your next dose is: Take  by mouth. Iron 325 mg (65 mg iron) tablet Generic drug:  ferrous sulfate Your last dose was: Your next dose is: Take  by mouth Daily (before breakfast). PRENATAL DHA+COMPLETE PRENATAL -300 mg-mcg-mg Cmpk Generic drug:  OVEOCPYS03-NHFI cornelia-folic-dha Your last dose was: Your next dose is: Take  by mouth. Indications: pregnancy ZANTAC 150 mg tablet Generic drug:  raNITIdine Your last dose was: Your next dose is: Take 150 mg by mouth two (2) times a day.   
 150 mg  
    
 Where to Get Your Medications These medications were sent to [address] [place] [address], [address] [place] [address] Drive [address] Phone:  [phone]  
  ibuprofen 800 mg tablet Discharge Instructions POST DELIVERY DISCHARGE INSTRUCTIONS Name: Kevin Martinez YOB: 1990 Primary Diagnosis: Active Problems: 
  Labor and delivery, indication for care (10/14/2018) General: 
Diet/Diet Restrictions: 
Eight 8-ounce glasses of fluid daily (water, juices); avoid excessive caffeine intake. Meals/snacks as desired which are high in fiber and carbohydrates and low in fat and cholesterol. Physical Activity / Restrictions / Safety:  
 
Avoid heavy lifting, no more that 8 lbs. For 2-3 weeks; limit use of stairs to 2 times daily for the first week home. No driving for one week. Avoid intercourse 4-6 weeks, no douching or tampon use. Check with obstetrician before starting or resuming an exercise program.    
 
 
Discharge Instructions/Special Treatment/Home Care Needs:  
 
Continue prenatal vitamins. Continue to use squirt bottle with warm water on your episiotomy after each bathroom use until bleeding stops. Call your doctor for the following:  
 
Fever over 101 degrees by mouth. Vaginal bleeding heavier than a normal menstrual period or clot larger than a golf ball. Red streaks or increased swelling of legs, painful red streaks on your breast. 
Painful urination, constipation and increased pain or swelling or discharge with your incision. If you feel extremely anxious or overwhelmed. If you have thoughts of harming yourself and/or your baby. Pain Management:  
 
Pain Management:  
Take Acetaminophen (Tylenol) or Ibuprofen (Advil, Motrin), as directed for pain.  Use a warm Sitz bath 3 times daily to relieve episiotomy or hemorrhoidal discomfort. Heating pad to  incision as needed. For hemorrhoidal discomfort, use Tucks and Anusol cream as needed and directed. Follow-Up Care: These are general instructions for a healthy lifestyle: No smoking/ No tobacco products/ Avoid exposure to second hand smoke Surgeon General's Warning:  Quitting smoking now greatly reduces serious risk to your health. Obesity, smoking, and sedentary lifestyle greatly increases your risk for illness A healthy diet, regular physical exercise & weight monitoring are important for maintaining a healthy lifestyle Recognize signs and symptoms of STROKE: 
 
F-face looks uneven A-arms unable to move or move unevenly S-speech slurred or non-existent T-time-call 911 as soon as signs and symptoms begin-DO NOT go Back to bed or wait to see if you get better-TIME IS BRAIN. Signed By: Álvaro Mcfadden RN                                                                                                   Date: 10/16/2018 Time: 12:31 PM 
 
 
 
  
  
  
FÃƒÂ©vrier 46 Announcement We are excited to announce that we are making your provider's discharge notes available to you in FÃƒÂ©vrier 46. You will see these notes when they are completed and signed by the physician that discharged you from your recent hospital stay. If you have any questions or concerns about any information you see in FÃƒÂ©vrier 46, please call the Health Information Department where you were seen or reach out to your Primary Care Provider for more information about your plan of care. Introducing [facility]! Dear [place]: 
Thank you for requesting a FÃƒÂ©vrier 46 account. Our records indicate that you already have an active FÃƒÂ©vrier 46 account. You can access your account anytime at [email or web address] Did you know that you can access your hospital and ER discharge instructions at any time in FÃƒÂ©vrier 46?   You can also review all of your test results from your hospital stay or ER visit. Additional Information If you have questions, please visit the Frequently Asked Questions section of the MyChart website at [email or web address] Remember, DrinkSendot is NOT to be used for urgent needs. For medical emergencies, dial 911. Now available from your iPhone and Android! Introducing Capo Diaz As a [facility] patient, I wanted to make you aware of our electronic visit tool called Capo Diaz. Veodin 24/FreedomPay allows you to connect within minutes with a medical provider 24 hours a day, seven days a week via a mobile device or tablet or logging into a secure website from your computer. You can access Capo Diaz from anywhere in the [place]. A virtual visit might be right for you when you have a simple condition and feel like you just dont want to get out of bed, or cant get away from work for an appointment, when your regular [facility] provider is not available (evenings, weekends or holidays), or when youre out of town and need minor care. Electronic visits cost only $49 and if the [employer] Man[employer]/FreedomPay provider determines a prescription is needed to treat your condition, one can be electronically transmitted to a nearby pharmacy*. Please take a moment to enroll today if you have not already done so. The enrollment process is free and takes just a few minutes. To enroll, please download the [employer]/FreedomPay wendy to your tablet or phone, or visit [email or web address] to enroll on your computer. And, as an [address] [place] [address] patient with a SportXast account, the results of your visits will be scanned into your electronic medical record and your primary care provider will be able to view the scanned results. We urge you to continue to see your regular [facility] provider for your ongoing medical care.   And while your primary care provider may not be the one available when you seek a ExploraMedsalfin virtual visit, the peace of mind you get from getting a real diagnosis real time can be priceless. For more information on [employer], view our Frequently Asked Questions (FAQs) at [email or web address]. Sincerely, 
 
Alaina Damico MD 
Chief Medical Officer Mohit Mercedes *:  certain medications cannot be prescribed via ExploraMedsalfin Providers Seen During Your Hospitalization Provider Specialty Primary office phone Diane Ordoñez MD Obstetrics & Gynecology [phone] Immunizations Administered for This Admission Name Date Influenza Vaccine (Quad) PF 10/16/2018 Your Primary Care Physician (PCP) Primary Care Physician Office Phone Office Fax Gladys Kali [phone] You are allergic to the following No active allergies Recent Documentation Breastfeeding? OB Status Smoking Status Unknown Recent pregnancy Never Smoker Emergency Contacts Name Discharge Info Relation Home Work Mobile [address] CAREGIVER [3] Spouse [3] [phone] Patient Belongings The following personal items are in your possession at time of discharge: 
  Dental Appliances: None  Visual Aid: Glasses, With patient      Home Medications: Kept at bedside   Jewelry: Earrings, Necklace  Clothing: Footwear, Pants, Shirt, Undergarments, With patient    Other Valuables: Cell Phone Discharge Instructions Attachments/References DEPRESSION: POSTPARTUM (ENGLISH) Patient Handouts Depression After Childbirth: Care Instructions Your Care Instructions Many women get the \"baby blues\" during the first few days after childbirth. You may lose sleep, feel irritable, and cry easily. You may feel happy one minute and sad the next.  Hormone changes are one cause of these emotional changes. Also, the demands of a new baby, along with visits from relatives or other family needs, add to a mother's stress. The \"baby blues\" often peak around the fourth day. Then they ease up in less than 2 weeks. If your moodiness or anxiety lasts for more than 2 weeks, or if you feel like life is not worth living, you may have postpartum depression. This is different for each mother. Some mothers with serious depression may worry intensely about their infant's well-being. Others may feel distant from their child. Some mothers might even feel that they might harm their baby. A mother may have signs of paranoia, wondering if someone is watching her. Depression is not a sign of weakness. It is a medical condition that requires treatment. Medicine and counseling often work well to reduce depression. Talk to your doctor about taking antidepressant medicine while breastfeeding. Follow-up care is a key part of your treatment and safety. Be sure to make and go to all appointments, and call your doctor if you are having problems. It's also a good idea to know your test results and keep a list of the medicines you take. How do you know if you are depressed? With all the changes in your life, you may not know if you are depressed. Pregnancy sometimes causes changes in how you feel that are similar to the symptoms of depression. Symptoms of depression include: · Feeling sad or hopeless and losing interest in daily activities. These are the most common symptoms of depression. · Sleeping too much or not enough. · Feeling tired. You may feel as if you have no energy. · Eating too much or too little. · Writing or talking about death, such as writing suicide notes or talking about guns, knives, or pills. Keep the numbers for these national suicide hotlines: 8-899-978-TALK (4-684.351.9395) and 3-785-ABYTIZA (6-421.384.1058).  If you or someone you know talks about suicide or feeling hopeless, get help right away. How can you care for yourself at home? · Be safe with medicines. Take your medicines exactly as prescribed. Call your doctor if you think you are having a problem with your medicine. · Eat a healthy diet so that you can keep up your energy. · Get regular daily exercise, such as walks, to help improve your mood. · Get as much sunlight as possible. Keep your shades and curtains open. Get outside as much as you can. · Avoid using alcohol or other substances to feel better. · Get as much rest and sleep as possible. Avoid doing too much. Being too tired can increase depression. · Play stimulating music throughout your day and soothing music at night. · Schedule outings and visits with friends and family. Ask them to call you regularly, so that you do not feel alone. · Ask for help with preparing food and other daily tasks. Family and friends are often happy to help a mother with a . · Be honest with yourself and those who care about you. Tell them about your struggle. · Join a support group of new mothers. No one can better understand the challenges of caring for a  than other new mothers. · If you feel like life is not worth living or are feeling hopeless, get help right away. Keep the numbers for these national suicide hotlines: 9-928-376-TALK (0-121.363.9088) and 0-495-DREWYRA (5-424.279.1129). When should you call for help? Call 911 anytime you think you may need emergency care. For example, call if: 
  · You feel you cannot stop from hurting yourself, your baby, or someone else.  
[facility] your doctor now or seek immediate medical care if: 
  · You are having trouble caring for yourself or your baby.  
  · You hear voices.  
Akiko Kraft closely for changes in your health, and be sure to contact your doctor if: 
  · You have problems with your depression medicine.  
  · You do not get better as expected. Where can you learn more? Go to http://kraig-dena.info/. Enter N192 in the search box to learn more about \"Depression After Childbirth: Care Instructions. \" Current as of: December 7, 2017 Content Version: 11.8 © 2006-2018 Healthwise, Incorporated. Care instructions adapted under license by [employer] (which disclaims liability or warranty for this information). If you have questions about a medical condition or this instruction, always ask your healthcare professional. [address] any warranty or liability for your use of this information. Please provide this summary of care documentation to your next provider. Signatures-by signing, you are acknowledging that this After Visit Summary has been reviewed with you and you have received a copy. Patient Signature:  ____________________________________________________________ Date:  ____________________________________________________________  
  
Margo William Provider Signature:  ____________________________________________________________ Date:  ____________________________________________________________

## 2018-10-15 LAB
HCT VFR BLD AUTO: 33.8 % (ref 35–45)
HGB BLD-MCNC: 11 G/DL (ref 12–16)

## 2018-10-15 PROCEDURE — 74011250637 HC RX REV CODE- 250/637: Performed by: OBSTETRICS & GYNECOLOGY

## 2018-10-15 PROCEDURE — 85014 HEMATOCRIT: CPT | Performed by: OBSTETRICS & GYNECOLOGY

## 2018-10-15 PROCEDURE — 65270000029 HC RM PRIVATE

## 2018-10-15 PROCEDURE — 36415 COLL VENOUS BLD VENIPUNCTURE: CPT | Performed by: OBSTETRICS & GYNECOLOGY

## 2018-10-15 PROCEDURE — 85018 HEMOGLOBIN: CPT | Performed by: OBSTETRICS & GYNECOLOGY

## 2018-10-15 RX ADMIN — IBUPROFEN 800 MG: 400 TABLET ORAL at 05:36

## 2018-10-15 RX ADMIN — IBUPROFEN 800 MG: 400 TABLET ORAL at 19:02

## 2018-10-15 RX ADMIN — ZOLPIDEM TARTRATE 5 MG: 5 TABLET ORAL at 22:21

## 2018-10-15 NOTE — ROUTINE PROCESS
Verbal shift change report given to Tomi Mclaughlin RN (oncoming nurse) by Shan Teran RN (offgoing nurse). Report included the following information Kardex, Intake/Output, MAR and Recent Results. 0915  Assessment done--voiding without difficulty--denies weakness when up--shower encouraged--baby is in SCN--is NPO for the present, but patient goes there to hold and visit with her baby--denies pain--po fluids encouraged. 1845--vital signs stable--voiding qs--has not needed medication for pain--ambulates to the Nursery to see her baby.

## 2018-10-15 NOTE — ANESTHESIA POSTPROCEDURE EVALUATION
Post-Anesthesia Evaluation & Assessment Visit Vitals  /67 (BP 1 Location: Right arm, BP Patient Position: Supine;Pre-activity)  Pulse 79  Temp 37.2 °C (98.9 °F)  Resp 16  SpO2 97%  Breastfeeding Unknown Nausea/Vomiting controlled Post-operative hydration adequate. Mental status & Level of consciousness: alert and oriented x 3 Neurological status: moves all extremities, sensation grossly intact Pulmonary status: airway patent, no supplemental oxygen required Complications related to anesthesia: none Patient has met all discharge requirements. Additional comments: 
 
 
 
Kevon Armstrong CRNA

## 2018-10-15 NOTE — PROGRESS NOTES
2100 - Pt new transfer from L&D to [facility] post delivery. Introduced self to pt & FOB. Orientated to [facility] and room. Questions answered. Discussed plan for shift, hourly rounding, pain reporting/management, normal vs abnormal findings, tests/procedures ordered, when to call nurse and pt safety. Questions answered. Infant currently in care of SCN, so pt transported via wheelchair to nursery accompanied by Behzad De Jesus RN and FOB. Pt instructed to call this nurse when back from nursery for VS and assessment. 2145 - Pt and FOB back to room from nursery. Pt out of wheelchair to bathroom and able to void x1. Ambulated back to bed w/o issue. No assistance needed. Assessment. Pt states pain improved w/ rest. Pt medicated w/ PRN ambien per request.  
 
10/15/18 
 
0135 - Pt OOB to bathroom and able to void x1 w/o issue. Void checks complete. Pt back to bed and assessment completed. Temp increasing, but pt not symptomatic and VS otherwise stable. Despite temperature change, assessment unchanged since previous one at 2145. Questions answered. 0530 - Assessment. No change from previous assessment other than temperature. Pt OOB to bathroom.

## 2018-10-15 NOTE — PROGRESS NOTES
PPD # 1 Patient doing well post-partum without significant complaint. Voiding without difficulty, normal lochia. Breastfeeding well. Baby in SCN. Vitals:  Patient Vitals for the past 8 hrs: 
 BP Temp Pulse Resp SpO2  
10/15/18 0530 107/67 98.9 °F (37.2 °C) 79 16 97 % 10/15/18 0330 - 99.4 °F (37.4 °C) - - -  
10/15/18 0135 131/69 100 °F (37.8 °C) 83 18 96 % Temp (24hrs), Av.6 °F (37 °C), Min:97.3 °F (36.3 °C), Max:100 °F (37.8 °C) Vital signs stable, afebrile. Exam:  Patient without distress. Breasts intact and nontender Abdomen soft, fundus firm at level of umbilicus, nontender Perineum with normal lochia noted. Lower extremities are negative for swelling, cords or tenderness. Lab/Data Review: CBC:  
Lab Results Component Value Date/Time HGB 11.0 (L) 10/15/2018 06:48 AM  
 HCT 33.8 (L) 10/15/2018 06:48 AM  
 
 
Assessment and Plan:  Patient appears to be having uncomplicated post-partum course. Continue routine perineal care and maternal education. Plan discharge tomorrow if no problems occur.  
 
Lance Whalen CNM 
10/15/2018 
8:40 AM

## 2018-10-15 NOTE — ROUTINE PROCESS
TRANSFER - IN REPORT: 
 
Verbal report received from 1891 Latosha Delgadillo RN (name) on [place]  being received from L&D (unit) for routine progression of care Report consisted of patients Situation, Background, Assessment and  
Recommendations(SBAR). Information from the following report(s) SBAR, Kardex, Intake/Output, MAR and Recent Results was reviewed with the receiving nurse. Opportunity for questions and clarification was provided. Assessment completed upon patients arrival to unit and care assumed.

## 2018-10-15 NOTE — PROGRESS NOTES
1905 - Bedside and Verbal shift change report given to MARIELA Estrada (oncoming nurse) by Lisa Lockett RN (offgoing nurse). Report included the following information SBAR, Kardex, Intake/Output, MAR and Recent Results. 1935 - Assessment. Discussed plan for shift, hourly rounding, pain reporting/management, normal vs abnormal findings, tests/procedures ordered, when to call nurse and pt safety. Questions answered. Infant in care of SCN. Pt encouraged to visit infant. 10/16/18 
 
0320 - Assessment. No change from earlier. Questions answered.  Pt medicated w/ PRN motrin per request.

## 2018-10-15 NOTE — ROUTINE PROCESS
[facility] care of patient, received report from David Correa RN. 
2025 Patient ambulated to restroom and was able to void, gavin-care given, pads and gown changed.

## 2018-10-16 VITALS
RESPIRATION RATE: 14 BRPM | TEMPERATURE: 97.8 F | SYSTOLIC BLOOD PRESSURE: 106 MMHG | DIASTOLIC BLOOD PRESSURE: 59 MMHG | HEART RATE: 73 BPM | OXYGEN SATURATION: 97 %

## 2018-10-16 PROCEDURE — 74011250637 HC RX REV CODE- 250/637: Performed by: OBSTETRICS & GYNECOLOGY

## 2018-10-16 PROCEDURE — 74011250636 HC RX REV CODE- 250/636: Performed by: OBSTETRICS & GYNECOLOGY

## 2018-10-16 PROCEDURE — 90471 IMMUNIZATION ADMIN: CPT

## 2018-10-16 PROCEDURE — 90686 IIV4 VACC NO PRSV 0.5 ML IM: CPT | Performed by: OBSTETRICS & GYNECOLOGY

## 2018-10-16 RX ORDER — IBUPROFEN 800 MG/1
800 TABLET ORAL
Qty: 30 TAB | Refills: 1 | Status: SHIPPED | OUTPATIENT
Start: 2018-10-16

## 2018-10-16 RX ADMIN — IBUPROFEN 800 MG: 400 TABLET ORAL at 12:00

## 2018-10-16 RX ADMIN — INFLUENZA VIRUS VACCINE 0.5 ML: 15; 15; 15; 15 SUSPENSION INTRAMUSCULAR at 07:51

## 2018-10-16 RX ADMIN — IBUPROFEN 800 MG: 400 TABLET ORAL at 03:23

## 2018-10-16 NOTE — DISCHARGE INSTRUCTIONS
POST DELIVERY DISCHARGE INSTRUCTIONS    Name: Pako Stovall  YOB: 1990  Primary Diagnosis: Active Problems:    Labor and delivery, indication for care (10/14/2018)        General:     Diet/Diet Restrictions:  Eight 8-ounce glasses of fluid daily (water, juices); avoid excessive caffeine intake. Meals/snacks as desired which are high in fiber and carbohydrates and low in fat and cholesterol. Physical Activity / Restrictions / Safety:     Avoid heavy lifting, no more that 8 lbs. For 2-3 weeks; limit use of stairs to 2 times daily for the first week home. No driving for one week. Avoid intercourse 4-6 weeks, no douching or tampon use. Check with obstetrician before starting or resuming an exercise program.         Discharge Instructions/Special Treatment/Home Care Needs:     Continue prenatal vitamins. Continue to use squirt bottle with warm water on your episiotomy after each bathroom use until bleeding stops. Call your doctor for the following:     Fever over 101 degrees by mouth. Vaginal bleeding heavier than a normal menstrual period or clot larger than a golf ball. Red streaks or increased swelling of legs, painful red streaks on your breast.  Painful urination, constipation and increased pain or swelling or discharge with your incision. If you feel extremely anxious or overwhelmed. If you have thoughts of harming yourself and/or your baby. Pain Management:     Pain Management:   Take Acetaminophen (Tylenol) or Ibuprofen (Advil, Motrin), as directed for pain. Use a warm Sitz bath 3 times daily to relieve episiotomy or hemorrhoidal discomfort. Heating pad to  incision as needed. For hemorrhoidal discomfort, use Tucks and Anusol cream as needed and directed. Follow-Up Care:      These are general instructions for a healthy lifestyle:    No smoking/ No tobacco products/ Avoid exposure to second hand smoke    Surgeon General's Warning:  Quitting smoking now greatly reduces serious risk to your health. Obesity, smoking, and sedentary lifestyle greatly increases your risk for illness    A healthy diet, regular physical exercise & weight monitoring are important for maintaining a healthy lifestyle    Recognize signs and symptoms of STROKE:    F-face looks uneven    A-arms unable to move or move unevenly    S-speech slurred or non-existent    T-time-call 911 as soon as signs and symptoms begin-DO NOT go       Back to bed or wait to see if you get better-TIME IS BRAIN.         Signed By: Krissy Reyes RN                                                                                                   Date: 10/16/2018 Time: 12:31 PM

## 2018-10-16 NOTE — DISCHARGE SUMMARY
Mother's Name:  [place]. MRN: [id number]. : 1990. HOSPITAL DISMISSAL NOTE 10/16/2018     VAGINAL  Delivery Date/Time:   @      at 36w5d weeks  BIRTH: attended by Jane Lozano MD    Obstetrical Discharge Summary     Name: [place] MRN: [id number]  SSN: [id number]    YOB: 1990  Age: 25 y.o. Admit Date: 10/14/2018    Discharge Date: 10/16/2018     Admitting Physician: Rajni Pitt MD     Attending Physician:  Jane Lozano MD     Admission Diagnoses: Labor and delivery, indication for care    Discharge Diagnoses:   Information for the patient's :  [facility] [[id number]]   Delivery of a 3.83 kg male infant via 5:47 PM on 10/14/2018 at   by Lyn Dyer were 7  and 7 . Additional Diagnoses:   Problem List as of 10/16/2018  Date Reviewed: 2017          Codes Class Noted - Resolved    Labor and delivery, indication for care ICD-10-CM: O75.9  ICD-9-CM: 659.90  10/14/2018 - Present        Pregnancy ICD-10-CM: Z34.90  ICD-9-CM: V22.2  2016 - Present        RESOLVED: Conjunctival cyst of left eye ICD-10-CM: H11.442  ICD-9-CM: 372.75  2017 - 2017             Lab Results   Component Value Date/Time    Rubella, External immune 2018    GrBStrep, External negative 10/02/2018       Immunization(s):   Immunization History   Administered Date(s) Administered    Influenza Vaccine (Quad) PF 10/16/2018    Tdap 2016, 2018        Hospital Course: Normal hospital course following the delivery. Patient Instructions:   Current Discharge Medication List      START taking these medications    Details   ibuprofen (MOTRIN) 800 mg tablet Take 1 Tab by mouth every eight (8) hours as needed for up to 30 doses. Qty: 30 Tab, Refills: 1         CONTINUE these medications which have NOT CHANGED    Details   raNITIdine (ZANTAC) 150 mg tablet Take 150 mg by mouth two (2) times a day.       prenatal24-iron cornelia-folic-dha (PRENATAL DHA+COMPLETE PRENATAL) -300 mg-mcg-mg cmpk Take  by mouth. Indications: pregnancy      ferrous sulfate (IRON) 325 mg (65 mg iron) tablet Take  by mouth Daily (before breakfast). DEXTROAMPHETAMINE/AMPHETAMINE (ADDERALL PO) Take  by mouth. She states she has no specific complaints and feels reasonably comfortable and is managed with ongoing medications. She is taking liquids, eating, ambulating and voiding acceptably well. The  infant is reportedly doing well. The patient acknowledged understanding dismissal instructions, her medication list and understanding NOT to drive a car for 10 days. Instruct patient to Continue Watching for Danger Signs at Home:    Excessive Bleeding, Fever,  Headache, High Blood Pressure or Worsening Pain. MEDICATION RECONCILIATION: The patient was carefully instructed that medication reconciliation at the hospital does not represent new prescriptions or independent recommendations for continuing medications. The patient understands she must contact the prescribing physician for the need to continue any non-ob-gyn medications. Reference my discharge instructions.     Follow-up Appointments   Procedures    FOLLOW UP VISIT Appointment in: One Month     Standing Status:   Standing     Number of Occurrences:   1     Order Specific Question:   Appointment in     Answer:   One Month        Signed By:    August Rose MD  2018 11:41 AM

## 2018-10-16 NOTE — ROUTINE PROCESS
Bedside and Verbal shift change report given to Darrel Cai RN 
 (oncoming nurse) by Donis Briseno RN (offgoing nurse). Report included the following information Intake/Output, MAR and Recent Results. 1240 Discharge education provided, mom verbalized understanding of all education provided. No questions at this time. All needs met. Electronic signature obtained. 1250 Patient left hospital at 1250 via wheelchair. Patient left with all belongs. Patient stable all vitals wnl.

## 2019-12-30 ENCOUNTER — HOSPITAL ENCOUNTER (OUTPATIENT)
Dept: PHYSICAL THERAPY | Age: 29
Discharge: HOME | End: 2019-12-30
Payer: COMMERCIAL

## 2019-12-30 PROCEDURE — 97530 THERAPEUTIC ACTIVITIES: CPT

## 2019-12-30 PROCEDURE — 97161 PT EVAL LOW COMPLEX 20 MIN: CPT

## 2019-12-30 NOTE — PROGRESS NOTES
[address] PHYSICAL THERAPY AT [facility]  [address] [facility] [address] [facility] [address], [place] [address], [address]  Phone [phone]  Fax [phone] / [address] [address] PHYSICAL THERAPY SERVICES  Patient Name: Brenda Rodriguez : 1990   Medical   Diagnosis: Left shoulder pain [M25.512] Treatment Diagnosis: Neck pain with L UE radiculopathy   Onset Date: 3 months ago (2019)     Referral Source: Bert Nageotte, MD [facility]): 2019   Prior Hospitalization: See medical history Provider #: [id number]   Prior Level of Function: Independent   Comorbidities: None   Medications: Verified on Patient Summary List   The Plan of Care and following information is based on the information from the initial evaluation.   =================================================================================  Assessment / key information:  Patient is a 30year old, right hand dominant female who presents to the clinic with c/o neck pain with left side radiculopathy which consists of left shoulder pain and numbness down her left arm along the posterior medial forearm into the 3rd, 4th, and 5th digits. . Patient reports that the pain began about 3 months ago with no known injury. Patient reports pain at night when she lays on her left side. Patient works 40-50 hours, predominately at a desk. Per patient MRI significant for pinched nerve; patient is currently on a course of steroid which has improved the numbness, but not pain. Pain worsens throughout the day. Clinical findings indicate impaired cervical ROM with sdiebending and RACHEL, L side  trigger points contributing to pain and radicular symptoms, + for pectoral tension, weakness of periscapular musculature. Functional impairments include decreased ability to lift her small children, decreased work tolerance, and pain affecting quality of life.    Clinical Findings:   Observation FHRS   AROM:    Cervical Flexion:44    Cervical Extension:47    Cervical Rotation: 61 B/L    Cervical Sidebending:L 25, R 36    Shoulder Flexion: L 151, R 153    Shoulder Abduction: L 146, R 152    FIR: L T10, R T12    RACHEL: L unable to touch contralateral scapular superior angle; R able to touch contralateral scapular superior angle   MMT:    Shoulder Flexion: 5/5    Shoulder Abduction: 5/5    IR: 5/5    ER: 5/5    Elbow Flexion: 5/5    Elbow Extension: 5/5    Wrist Flexion: 5/5    Wrist Extension: 5/5   Palpation:    TTP B SCM, LS, L Infraspinatus (+ TrP in these muscles)   Bashir: -   Speeds: -   Neer's: -   Overall - for shoulder impingement     FOTO: 64/100   =================================================================================  Eval Complexity: History: LOW Complexity : Zero comorbidities / personal factors that will impact the outcome / POCExam:HIGH Complexity : 4+ Standardized tests and measures addressing body structure, function, activity limitation and / or participation in recreation  Presentation: MEDIUM Complexity : Evolving with changing characteristics  Clinical Decision Making:MEDIUM Complexity : FOTO score of 26-74Overall Complexity:LOW   Problem List: pain affecting function, decrease ROM, decrease strength, decrease ADL/ functional abilitiies, decrease activity tolerance and decrease flexibility/ joint mobility   Treatment Plan may include any combination of the following: Therapeutic exercise, Therapeutic activities, Neuromuscular re-education, Physical agent/modality, Manual therapy and Patient education  Patient / Family readiness to learn indicated by: asking questions  Persons(s) to be included in education: patient (P)  Barriers to Learning/Limitations: None  Measures taken:    Patient Goal (s): \"no more pain\"   Patient self reported health status: excellent  Rehabilitation Potential: excellent   Short Term Goals: To be accomplished in  4  treatments:  1.  Patient will be independent with HEP to improve ROM and self-manage symptoms.  Long Term Goals: To be accomplished in  8-12  treatments:  1. Patient will be independent with HEP to improve scapular strength in order to achieve and maintain neutral posture required to lift and work. 2. Patient will improve left sidebending equal to the left to improve ability to perform work with decreased pain. 3. Patient will report no numbness/tingling for 3 consecutive visits to indicate improved quality of life. 4. Patient will report FOTO score of at least 76/100 to indicate improved functional mobility. Frequency / Duration:   Patient to be seen  2  times per week for 8-12  treatments:  Patient / Caregiver education and instruction: activity modification - lifting technique, HEP for stretching  G-Codes (GP): SOFY  Therapist Signature: Stephanie Meyer Date: 06/59/8799   Certification Period: NA Time: 2:34 PM   ===========================================================================================  I certify that the above Physical Therapy Services are being furnished while the patient is under my care. I agree with the treatment plan and certify that this therapy is necessary. Physician Signature:        Date:       Time:     Please sign and return to [facility] or you may fax the signed copy to [phone]. Thank you.

## 2019-12-30 NOTE — PROGRESS NOTES
PT DAILY TREATMENT NOTE/CERVICAL [id number]    Patient Name: Yuan Crooks  Date:2019  : 1990  [x]  Patient  Verified  Payor: BLUE CROSS / Plan: [address] [place] [address] / Product Type: PPO /    In time:   Out time:  Total Treatment Time (min): 40  Visit #: 1 of     Medicare/[facility] Only   Total Timed Codes (min):  20 1:1 Treatment Time:  40     Treatment Area: Left shoulder pain [M25.512]    SUBJECTIVE  Pain Level (0-10 scale): 1/10  []constant []intermittent []improving []worsening []no change since onset    Any medication changes, allergies to medications, adverse drug reactions, diagnosis change, or new procedure performed?: [x] No    [] Yes (see summary sheet for update)  Subjective functional status/changes:     Patient is a 30year old, right hand dominant female who presents to the clinic with c/o neck pain with left side radiculopathy which consists of left shoulder pain and numbness down her left arm along the posterior medial forearm into the 3rd, 4th, and 5th digits. . Patient reports that the pain began about 3 months ago with no known injury. Patient reports pain at night when she lays on her left side. Patient works 40-50 hours, predominately at a desk. Per patient MRI significant for pinched nerve; patient is currently on a course of steroid which has improved the numbness, but not pain. Pain worsens throughout the day.     20 min [x]Eval                  []Re-Eval       20 min Therapeutic Activity:  []  See flow sheet : Computer Posture, Lifting Mechanics, HEP   Rationale: increase strength  to improve the patients ability to lift with reduced pain and symptoms            With   [x] TE   [] TA   [] neuro   [] other: Patient Education: [x] Review HEP    [] Progressed/Changed HEP based on:   [] positioning   [] body mechanics   [] transfers   [] heat/ice application    [] other:      Physical Therapy Evaluation Cervical Spine    Observation FHRS                 AROM: Cervical Flexion:44                                Cervical Extension:47                                Cervical Rotation: 61 B/L                                Cervical Sidebending:L 25, R 36                                Shoulder Flexion: L 151, R 153                                Shoulder Abduction: L 146, R 152                                FIR: L T10, R T12                                RACHEL: L anble to touch contralateral scapular superior angle; R able to touch contralateral scapular superior angle                 MMT:                                Shoulder Flexion: 5/5                                Shoulder Abduction: 5/5                                IR: 5/5                                ER: 5/5                                Elbow Flexion: 5/5                                Elbow Extension: 5/5                                Wrist Flexion: 5/5                                Wrist Extension: 5/5                 Palpation:                                TTP B SCM, LS, L Infraspinatus (+ TrP in theset muscles)                 Bashir: -                 Speeds: -                 Neer's: -                 Overall - for shoulder impingement                    FOTO: 64/100       Pain Level (0-10 scale) post treatment: 2/10    ASSESSMENT/Changes in Function: Patient is a 30year old, right hand dominant female who presents to the clinic with c/o neck pain with left side radiculopathy which consists of left shoulder pain and numbness down her left arm along the posterior medial forearm into the 3rd, 4th, and 5th digits. . Patient reports that the pain began about 3 months ago with no known injury. Patient reports pain at night when she lays on her left side. Patient works 40-50 hours, predominately at a desk. Per patient MRI significant for pinched nerve; patient is currently on a course of steroid which has improved the numbness, but not pain. Pain worsens throughout the day. Clinical findings indicate impaired cervical ROM with sdiebending and RACHEL, L side  trigger points contributing to pain and radicular symptoms, + for pectoral tension, weakness of periscapular musculature. Functional impairments include decreased ability to lift her small children, decreased work tolerance, and pain affecting quality of life. Patient will continue to benefit from skilled PT services to modify and progress therapeutic interventions, address functional mobility deficits, address ROM deficits, address strength deficits, analyze and address soft tissue restrictions, analyze and cue movement patterns, analyze and modify body mechanics/ergonomics and assess and modify postural abnormalities to attain remaining goals. [x]  See Plan of Care  []  See progress note/recertification  []  See Discharge Summary         Progress towards goals / Updated goals: · Short Term Goals: To be accomplished in  4  treatments:  1. Patient will be independent with HEP to improve ROM and self-manage symptoms. · Long Term Goals: To be accomplished in  8-12  treatments:  1. Patient will be independent with HEP to improve scapular strength in order to achieve and maintain neutral posture required to lift and work. 2. Patient will improve left sidebending equal to the left to improve ability to perform work with decreased pain. 3. Patient will report no numbness/tingling for 3 consecutive visits to indicate improved quality of life.   4. Patient will report FOTO score of at least 76/100 to indicate improved functional mobility.       PLAN  []  Upgrade activities as tolerated     []  Continue plan of care  []  Update interventions per flow sheet       []  Discharge due to:_  [x]  Other:_2x week for 8-12 visits       Nicolette Lay PT, DPT  12/30/2019  3:48 PM

## 2020-01-02 ENCOUNTER — HOSPITAL ENCOUNTER (OUTPATIENT)
Dept: PHYSICAL THERAPY | Age: 30
Discharge: HOME | End: 2020-01-02
Payer: COMMERCIAL

## 2020-01-02 PROCEDURE — 97110 THERAPEUTIC EXERCISES: CPT

## 2020-01-02 PROCEDURE — 97140 MANUAL THERAPY 1/> REGIONS: CPT

## 2020-01-02 NOTE — PROGRESS NOTES
PT DAILY TREATMENT NOTE - [facility]     Patient Name: Mladonado Monroe  Date:2020  : 1990  [x]  Patient  Verified  Payor: BLUE CROSS / Plan: [address] [place] [address] / Product Type: PPO /    In time: 1130  Out time: 1047  Total Treatment Time (min): 51  Total Timed Codes (min): 51  1:1 Treatment Time ( only): 55  Visit #: 2 of     Treatment Area: Left shoulder pain [M25.512]    SUBJECTIVE  Pain Level IN:(0-10 scale): 0-1/10  Pain Level OUT: (0-10 scale) post treatment: 1-210    Any medication changes, allergies to medications, adverse drug reactions, diagnosis change, or new procedure performed?: [x] No    [] Yes (see summary sheet for update)  Subjective functional status/changes:   [x] No changes reported    OBJECTIVE    41/36 min Therapeutic Exercise:  [x] See flow sheet : first follow up visit since initial evaluation - initiated POC per flow sheet    Rationale: increase strength  to improve the patients ability to lift with reduced pain and symptoms    10 min Manual Therapy:  DTM/TrP to left pectorals, Passive Stretching of UT/Scalenes, TrP to left scalenes   Rationale: decrease pain, increase ROM, increase tissue extensibility and decrease trigger points to reduce radicular symptoms associated with activity. With   [x] TE   [] TA   [] neuro   [] other: Patient Education: [x] Review HEP - added scalene stretches   [] Progressed/Changed HEP based on:   [] positioning   [] body mechanics   [] transfers   [] heat/ice application    [] other:      Objective/Functional Measures with Therapist Assessment Noted with Response to Therapy Session[de-identified] first follow up visit since initial evaluation - initiated POC per flow sheet   + Chelle's Test for TOS  Added scalene and foam roll stretches      ASSESSMENT/Changes in Function: Patient reported that her radicular symptoms increase when her arms are shoulder height or higher, and the symptoms reduce when she lowers her arms.  Patient has positive Chelle's test indicating possible TOS. Patient's let scalenes were found to have increased tension compared to the right. PT added scalene and additional pectoral stretches to address possible TOS. Continue to progress per patient's tolerance. Patient will continue to benefit from skilled PT services to modify and progress therapeutic interventions, address functional mobility deficits, address ROM deficits, address strength deficits, analyze and address soft tissue restrictions, analyze and cue movement patterns, analyze and modify body mechanics/ergonomics and assess and modify postural abnormalities to attain remaining goals. [x]  See Plan of Care  []  See progress note/recertification  []  See Discharge Summary         Progress towards goals / Updated goals: · Short Term Goals: To be accomplished in  4  treatments:  1. Patient will be independent with HEP to improve ROM and self-manage symptoms. · Long Term Goals: To be accomplished in  8-12  treatments:  1. Patient will be independent with HEP to improve scapular strength in order to achieve and maintain neutral posture required to lift and work. 2. Patient will improve left sidebending equal to the left to improve ability to perform work with decreased pain. 3. Patient will report no numbness/tingling for 3 consecutive visits to indicate improved quality of life. 4. Patient will report FOTO score of at least 76/100 to indicate improved functional mobility.     PLAN  [x]  Upgrade activities as tolerated     [x]  Continue plan of care  []  Update interventions per flow sheet       []  Discharge due to:_  []  Other:_      Hope Maradiaga, PT, DPT   1/2/2020  1257 PM    Future Appointments   Date Time Provider Margot Lopez   1/2/2020 11:30 AM [address] [place] [address] [facility]   1/7/2020 12:00 PM Carlitos De aL Garza [facility]   1/9/2020  3:30 PM Leana Cosme, PTA [facility]   1/14/2020  3:30 PM Leana Ratel, [facility]   1/21/2020  3:30 PM Mauro Castillo, [facility] [address]

## 2020-01-07 ENCOUNTER — HOSPITAL ENCOUNTER (OUTPATIENT)
Dept: PHYSICAL THERAPY | Age: 30
Discharge: HOME | End: 2020-01-07
Payer: COMMERCIAL

## 2020-01-07 PROCEDURE — 97140 MANUAL THERAPY 1/> REGIONS: CPT

## 2020-01-07 PROCEDURE — 97110 THERAPEUTIC EXERCISES: CPT

## 2020-01-07 NOTE — PROGRESS NOTES
PT DAILY TREATMENT NOTE - [facility]     Patient Name: Lillie Lopez  Date:2020  : 1990  [x]  Patient  Verified  Payor: BLUE CROSS / Plan: [address] [place] [address] / Product Type: PPO /    In time: 6548  Out time: 100  Total Treatment Time (min): 48  Total Timed Codes (min): 38  1:1 Treatment Time ( only): 38  Visit #: 3 of     Treatment Area: Left shoulder pain [M25.512]    SUBJECTIVE  Pain Level IN:(0-10 scale):  3/10  Pain Level OUT: (0-10 scale) post treatment: 0/10    Any medication changes, allergies to medications, adverse drug reactions, diagnosis change, or new procedure performed?: [x] No    [] Yes (see summary sheet for update)  Subjective functional status/changes:   [] No changes reported  I had a lot of soreness after my last session, but I have noticed less numbness into my hand. OBJECTIVE  Modality rationale: decrease pain and increase tissue extensibility to improve the patients ability to to tolerate ADLs without an increase in pain     Min Type Additional Details     []? Estim: []? Att   []? Unatt  []? TENS instruct                 []?IFC  []? Premod []? NMES                       []?Other:  []?w/US   []?w/ice   []?w/heat  Position:  Location:     []? Traction: []? Cervical       []? Lumbar                       []? Prone          []? Supine                       []?Intermittent   []? Continuous Lbs:  []? before manual  []? after manual     []? Ultrasound: []? Continuous   []? Pulsed                           []? 1MHz   []? 3MHz Location:  W/cm2:     []? Iontophoresis with dexamethasone         Location: []? Take home patch   []? In clinic   10  []? Ice     [x]? heat  []? Ice massage Position: supine  Location: C spine. []? Vasopneumatic Device Pressure: []? lo []? med []? hi   Temp: []? lo []? med []? hi   [x]? Skin assessment post-treatment:  [x]? intact []? redness- no adverse reaction       []? redness  adverse reaction:       14/9 min Therapeutic Exercise:  [x] See flow sheet: 5 min NC for warm up     Rationale: increase strength  to improve the patients ability to lift with reduced pain and symptoms    24 min Manual Therapy:  DTM/TrP to left pectorals, Passive Stretching of UT/Scalenes, TrP to left scalenes. Myofascial release to Left UT and scalenes. Gentle contract/relax to decrease tension along Left scalenes. Rationale: decrease pain, increase ROM, increase tissue extensibility and decrease trigger points to reduce radicular symptoms associated with activity. With   [x] TE   [] TA   [] neuro   [] other: Patient Education: [x] Review HEP - added scap stabs   [] Progressed/Changed HEP based on:   [] positioning   [] body mechanics   [] transfers   [] heat/ice application    [] other:      Objective/Functional Measures with Therapist Assessment Noted with Response to Therapy Session:  Increased mm tightness noted across Left scalenes and pectorals. PT issued HEP with scap stabs to address postural weakness. ASSESSMENT/Changes in Function: Patient responded well to manual stretching and soft tissue work to decreased mm tension that is most likely leading to her numbness, tingling and pain. PT discussed with patient to perform doorway, upper trap and scalene stretches intermittently during her work day to assist in improving mm tension. Patient will continue to benefit from skilled PT services to modify and progress therapeutic interventions, address functional mobility deficits, address ROM deficits, address strength deficits, analyze and address soft tissue restrictions, analyze and cue movement patterns, analyze and modify body mechanics/ergonomics and assess and modify postural abnormalities to attain remaining goals. [x]  See Plan of Care  []  See progress note/recertification  []  See Discharge Summary         Progress towards goals / Updated goals: · Short Term Goals: To be accomplished in  4  treatments:  1.  Patient will be independent with HEP to improve ROM and self-manage symptoms. Patient reports I with stretching issued at eval. 1/7/2020 HLL     · Long Term Goals: To be accomplished in  8-12  treatments:  1. Patient will be independent with HEP to improve scapular strength in order to achieve and maintain neutral posture required to lift and work. 2. Patient will improve left sidebending equal to the left to improve ability to perform work with decreased pain. 3. Patient will report no numbness/tingling for 3 consecutive visits to indicate improved quality of life. 4. Patient will report FOTO score of at least 76/100 to indicate improved functional mobility.     PLAN  [x]  Upgrade activities as tolerated     [x]  Continue plan of care  []  Update interventions per flow sheet       []  Discharge due to:_  []  Other:_      Wendi Zamora, PT, PT, DPT   1/7/2020  1257 PM    Future Appointments   Date Time Provider Margot Lopez   1/7/2020 12:00 PM Quyen Trejo [facility]   1/9/2020  3:30 PM Josefina Edwards PTA [facility]   1/14/2020  3:30 PM Josefina Edwards PTA [facility]   1/21/2020  3:30 PM Frank Cha, PTA [facility]

## 2020-01-09 ENCOUNTER — HOSPITAL ENCOUNTER (OUTPATIENT)
Dept: PHYSICAL THERAPY | Age: 30
Discharge: HOME | End: 2020-01-09
Payer: COMMERCIAL

## 2020-01-09 PROCEDURE — 97110 THERAPEUTIC EXERCISES: CPT

## 2020-01-09 PROCEDURE — 97140 MANUAL THERAPY 1/> REGIONS: CPT

## 2020-01-09 PROCEDURE — 97035 APP MDLTY 1+ULTRASOUND EA 15: CPT

## 2020-01-09 NOTE — PROGRESS NOTES
PT DAILY TREATMENT NOTE - [facility]     Patient Name: Lillie Lopez  Date:2020  : 1990  [x]  Patient  Verified  Payor: Iban Amos / Plan: [address] [place] [id number] / Product Type: PPO /    In time: 3:30  Out time: 4:30  Total Treatment Time (min):60  Total Timed Codes (min): 50  1:1 Treatment Time ( only): 45  Visit #: 4 of     Treatment Area: Left shoulder pain [M25.512]    SUBJECTIVE  Pain Level IN:(0-10 scale):  2/10  Pain Level OUT: (0-10 scale) post treatment: 0/10    Any medication changes, allergies to medications, adverse drug reactions, diagnosis change, or new procedure performed?: [x] No    [] Yes (see summary sheet for update)  Subjective functional status/changes:   [] No changes reported  I have like stiffness right in the center of my neck - like in the bone     OBJECTIVE  Modality rationale: decrease pain and increase tissue extensibility to improve the patients ability to to tolerate ADLs without an increase in pain     Min Type Additional Details     []? Estim: []? Att   []? Unatt  []? TENS instruct                 []?IFC  []? Premod []? NMES                       []?Other:  []?w/US   []?w/ice   []?w/heat  Position:  Location:     []? Traction: []? Cervical       []? Lumbar                       []? Prone          []? Supine                       []?Intermittent   []? Continuous Lbs:  []? before manual  []? after manual    8+2 set up  [x]? Ultrasound: [x]? Continuous   []? Pulsed                           [x]? 1MHz   []? 3MHz Location: 4 min to (L) and (R) paraspinals   W/cm2: 1.5     []? Iontophoresis with dexamethasone         Location: []? Take home patch   []? In clinic   10  []? Ice     [x]? heat  []? Ice massage Position: sitting  Location: C spine. []? Vasopneumatic Device Pressure: []? lo []? med []? hi   Temp: []? lo []? med []? hi   [x]? Skin assessment post-treatment:  [x]? intact []? redness- no adverse reaction       []? redness  adverse reaction:       20/15 min Therapeutic Exercise:  [x] See flow sheet: 5 min NC for warm up  Added prone letter, T M with #2 and Y #0     Rationale: increase strength  to improve the patients ability to lift with reduced pain and symptoms    20 min Manual Therapy: performed MET to correct left rotation lesion to approx C3-5 - performed correction to left and right with manual SOR to    Rationale: decrease pain, increase ROM, increase tissue extensibility and decrease trigger points to reduce radicular symptoms associated with activity. With   [x] TE   [] TA   [] neuro   [] other: Patient Education: [x] Review HEP - added scap stabs   [] Progressed/Changed HEP based on:   [] positioning   [] body mechanics   [] transfers   [] heat/ice application    [] other:      Objective/Functional Measures with Therapist Assessment Noted with Response to Therapy Session:  Patient presented with rotation lesion to the (L) - performed MET to left and right to assist with correcting the rotation lesion   Performed US to (B) cervical paraspinals afterwards to assist with tissue tension and tightness - the (L) cervical paraspinals was greater than the (R) with increase pain reported with palpation to area     ASSESSMENT/Changes in Function:   Patient will continue to benefit from skilled PT services to modify and progress therapeutic interventions, address functional mobility deficits, address ROM deficits, address strength deficits, analyze and address soft tissue restrictions, analyze and cue movement patterns, analyze and modify body mechanics/ergonomics and assess and modify postural abnormalities to attain remaining goals. [x]  See Plan of Care  []  See progress note/recertification  []  See Discharge Summary         Progress towards goals / Updated goals: · Short Term Goals: To be accomplished in  4  treatments:  1. Patient will be independent with HEP to improve ROM and self-manage symptoms.  Patient reports I with stretching issued at [facility]. 1/7/2020 HLL     · Long Term Goals: To be accomplished in  8-12  treatments:  1. Patient will be independent with HEP to improve scapular strength in order to achieve and maintain neutral posture required to lift and work. 2. Patient will improve left sidebending equal to the left to improve ability to perform work with decreased pain. 3. Patient will report no numbness/tingling for 3 consecutive visits to indicate improved quality of life. 4. Patient will report FOTO score of at least 76/100 to indicate improved functional mobility.     PLAN  [x]  Upgrade activities as tolerated     [x]  Continue plan of care  []  Update interventions per flow sheet       []  Discharge due to:_  []  Other:_      Kirti Palmer PTA,    1/9/2020  1257 PM    Future Appointments   Date Time Provider Margot Lopez   1/14/2020  3:30 PM Celine Alaniz [facility]   1/21/2020  3:30 PM Tarun Kirkland PTA [facility]

## 2020-01-14 ENCOUNTER — APPOINTMENT (OUTPATIENT)
Dept: PHYSICAL THERAPY | Age: 30
End: 2020-01-14
Payer: COMMERCIAL

## 2020-01-16 ENCOUNTER — HOSPITAL ENCOUNTER (OUTPATIENT)
Dept: PHYSICAL THERAPY | Age: 30
Discharge: HOME | End: 2020-01-16
Payer: COMMERCIAL

## 2020-01-16 PROCEDURE — 97110 THERAPEUTIC EXERCISES: CPT

## 2020-01-16 PROCEDURE — 97140 MANUAL THERAPY 1/> REGIONS: CPT

## 2020-01-16 NOTE — PROGRESS NOTES
PT DAILY TREATMENT NOTE - [facility]     Patient Name: Verenice Greer  Date:2020  : 1990  [x]  Patient  Verified  Payor: BLUE CROSS / Plan: [address] [place] [address] / Product Type: PPO /    In time: 9:15Out time: 10:25  Total Treatment Time (min):70  Total Timed Codes (min): 55  1:1 Treatment Time ( only): 50  Visit #: 5 of     Treatment Area: Left shoulder pain [M25.512]    SUBJECTIVE  Pain Level IN:(0-10 scale):  2-3/10  Pain Level OUT: (0-10 scale) post treatment: 0/10    Any medication changes, allergies to medications, adverse drug reactions, diagnosis change, or new procedure performed?: [x] No    [] Yes (see summary sheet for update)  Subjective functional status/changes:   [] No changes reported  My neck feels better it feels like it moved from the base of my skull to top of my shoulders and no pain in the (L) arm since last time I was here    OBJECTIVE         Modality rationale: decrease pain and increase tissue extensibility to improve the patients ability to to tolerate ADLs without an increase in pain     Min Type Additional Details     []? Estim: []? Att   []? Unatt  []? TENS instruct                 []?IFC  []? Premod []? NMES                       []?Other:  []?w/US   []?w/ice   []?w/heat  Position:  Location:     []? Traction: []? Cervical       []? Lumbar                       []? Prone          []? Supine                       []?Intermittent   []? Continuous Lbs:  []? before manual  []? after manual    8+2 set up  [x]? Ultrasound: [x]? Continuous   []? Pulsed                           [x]? 1MHz   []? 3MHz Location: 4 min to (L) and (R) mid scap    W/cm2: 1.5     []? Iontophoresis with dexamethasone         Location: []? Take home patch   []? In clinic   15 []? Ice     [x]? heat  []? Ice massage Position: sitting  Location: C spine. []? Vasopneumatic Device Pressure: []? lo []? med []? hi   Temp: []? lo []? med []? hi   [x]? Skin assessment post-treatment:  [x]? intact []?redness- no adverse reaction       []? redness  adverse reaction:       25/20 min Therapeutic Exercise:  [x] See flow sheet: 5 min NC for warm up  Added side lying serratus punches with 0# at 90 and 120 degrees      Rationale: increase strength  to improve the patients ability to lift with reduced pain and symptoms    20 min Manual Therapy: in prone performed DTM and TPR to (B) medial border of scapula and upper thoracic area   Rationale: decrease pain, increase ROM, increase tissue extensibility and decrease trigger points to reduce radicular symptoms associated with activity. With   [x] TE   [] TA   [] neuro   [] other: Patient Education: [x] Review HEP - added scap stabs   [] Progressed/Changed HEP based on:   [] positioning   [] body mechanics   [] transfers   [] heat/ice application    [] other:      Objective/Functional Measures with Therapist Assessment Noted with Response to Therapy Session:  Checked cervical spine for rotation lesions - none present  Placed patient in prone and performed DTM and TPR to the upper thoracic and mid border of scap     GOALS   3. Patient will report no numbness/tingling for 3 consecutive visits to indicate improved quality of life. 1 of 3 visits 1/16/2020    ASSESSMENT/Changes in Function:   Patient will continue to benefit from skilled PT services to modify and progress therapeutic interventions, address functional mobility deficits, address ROM deficits, address strength deficits, analyze and address soft tissue restrictions, analyze and cue movement patterns, analyze and modify body mechanics/ergonomics and assess and modify postural abnormalities to attain remaining goals. [x]  See Plan of Care  []  See progress note/recertification  []  See Discharge Summary         Progress towards goals / Updated goals: · Short Term Goals: To be accomplished in  4  treatments:  1. Patient will be independent with HEP to improve ROM and self-manage symptoms.  Patient reports I with stretching issued at eval. 1/7/2020 HLL     · Long Term Goals: To be accomplished in  8-12  treatments:  1. Patient will be independent with HEP to improve scapular strength in order to achieve and maintain neutral posture required to lift and work. 2. Patient will improve left sidebending equal to the left to improve ability to perform work with decreased pain. 3. Patient will report no numbness/tingling for 3 consecutive visits to indicate improved quality of life. 1 of 3 visits 1/16/2020  4. Patient will report FOTO score of at least 76/100 to indicate improved functional mobility.     PLAN  [x]  Upgrade activities as tolerated     [x]  Continue plan of care  []  Update interventions per flow sheet       []  Discharge due to:_  []  Other:_      Jamshid Guerrier PTA,    1/16/2020  1257 PM    Future Appointments   Date Time Provider Margot Lopez   1/21/2020  3:30 PM Bree Hayden PTA [facility]

## 2020-01-21 ENCOUNTER — APPOINTMENT (OUTPATIENT)
Dept: PHYSICAL THERAPY | Age: 30
End: 2020-01-21
Payer: COMMERCIAL

## 2020-01-22 ENCOUNTER — APPOINTMENT (OUTPATIENT)
Dept: PHYSICAL THERAPY | Age: 30
End: 2020-01-22
Payer: COMMERCIAL

## 2020-03-19 NOTE — PROGRESS NOTES
[address] [facility] AT [facility]  [address] [place] [address],[address], [place] luna, Mehreen Craig  Phone [phone]  Fax  SUMMARY  Patient Name: Partha Loo : 1990   Treatment/Medical Diagnosis: Left shoulder pain [M25.512]   Referral Source: Adalberto Jessica MD     Date of Initial Visit: 2019 Attended Visits: 5 Missed Visits: 0       Summary of Care: Thank you for referring this pleasant patient. [place] has completed 5 of 8-12 proposed sessions  Patient did not return to physical therapy after 5th visit on 2020 - reason unknown  Patient did not contact office for any additional need or follow up visits -   Will discharge patient at this time and if patient contact office after today will advise patient that is any additional follow up or recommendation is needed to return to referring physician. Reason for Discharge:  [x] The patient was non-compliant with attendance. [x] The patient could not be contacted to schedule further therapy sessions. [] The patient has been discharged based on the orders of the referring physician.  [] The patient has requested to be discharged due to:   [] Patient has met all goals or max benefit has been achieved      If you have any questions/comments please contact us directly at [phone]. Thank you for allowing us to assist in the care of your patient.     Therapist Signature: Martín Figueroa PTA Date: 3/19/2020     Time: 8:17 AM

## 2023-12-04 ENCOUNTER — HOSPITAL ENCOUNTER (OUTPATIENT)
Facility: HOSPITAL | Age: 33
Setting detail: SPECIMEN
Discharge: HOME | End: 2023-12-07
Payer: COMMERCIAL

## 2023-12-04 PROCEDURE — 88175 CYTOPATH C/V AUTO FLUID REDO: CPT

## 2023-12-04 PROCEDURE — 87624 HPV HI-RISK TYP POOLED RSLT: CPT
